# Patient Record
Sex: MALE | Race: WHITE | NOT HISPANIC OR LATINO | Employment: FULL TIME | URBAN - METROPOLITAN AREA
[De-identification: names, ages, dates, MRNs, and addresses within clinical notes are randomized per-mention and may not be internally consistent; named-entity substitution may affect disease eponyms.]

---

## 2020-06-08 ENCOUNTER — HOSPITAL ENCOUNTER (EMERGENCY)
Facility: HOSPITAL | Age: 20
Discharge: HOME | End: 2020-06-08
Attending: EMERGENCY MEDICINE

## 2020-06-08 VITALS
RESPIRATION RATE: 15 BRPM | DIASTOLIC BLOOD PRESSURE: 71 MMHG | HEIGHT: 73 IN | OXYGEN SATURATION: 99 % | HEART RATE: 72 BPM | TEMPERATURE: 98.8 F | BODY MASS INDEX: 32.43 KG/M2 | SYSTOLIC BLOOD PRESSURE: 155 MMHG | WEIGHT: 244.7 LBS

## 2020-06-08 DIAGNOSIS — T78.40XA ALLERGIC REACTION, INITIAL ENCOUNTER: Primary | ICD-10-CM

## 2020-06-08 DIAGNOSIS — Z91.030 BEE STING ALLERGY: ICD-10-CM

## 2020-06-08 PROCEDURE — 3E0333Z INTRODUCTION OF ANTI-INFLAMMATORY INTO PERIPHERAL VEIN, PERCUTANEOUS APPROACH: ICD-10-PCS | Performed by: EMERGENCY MEDICINE

## 2020-06-08 PROCEDURE — 99284 EMERGENCY DEPT VISIT MOD MDM: CPT | Mod: 25

## 2020-06-08 PROCEDURE — 63600000 HC DRUGS/DETAIL CODE: Performed by: EMERGENCY MEDICINE

## 2020-06-08 PROCEDURE — 96374 THER/PROPH/DIAG INJ IV PUSH: CPT

## 2020-06-08 RX ADMIN — METHYLPREDNISOLONE SODIUM SUCCINATE 60 MG: 125 INJECTION, POWDER, FOR SOLUTION INTRAMUSCULAR; INTRAVENOUS at 17:20

## 2020-06-08 ASSESSMENT — ENCOUNTER SYMPTOMS
COLOR CHANGE: 0
ALLERGIC REACTION: 1
DIFFICULTY BREATHING: 1
CHILLS: 0
SHORTNESS OF BREATH: 1
FEVER: 0
SEIZURES: 0
DIARRHEA: 0
ABDOMINAL PAIN: 0
EYE PAIN: 0
DYSURIA: 0
ARTHRALGIAS: 0
BACK PAIN: 0
COUGH: 0
SORE THROAT: 0
PALPITATIONS: 0
HEMATURIA: 0
VOMITING: 0

## 2020-06-08 NOTE — ED PROVIDER NOTES
HPI     Chief Complaint   Patient presents with   • Allergic Reaction         History provided by:  Patient  Allergic Reaction   Presenting symptoms: difficulty breathing, itching and swelling    Presenting symptoms: no rash    Severity:  Moderate  Duration:  15 minutes  Prior allergic episodes:  Insect allergies  Context: insect bite/sting (R foot, unseen insect, felt like a bee sting)    Relieved by:  Antihistamines and epinephrine (Epipen given by EMS)  Worsened by:  Nothing  Ineffective treatments:  None tried       Patient History     History reviewed. No pertinent past medical history.    Past Surgical History:   Procedure Laterality Date   • WISDOM TOOTH EXTRACTION         History reviewed. No pertinent family history.    Social History     Tobacco Use   • Smoking status: Never Smoker   • Smokeless tobacco: Never Used   Substance Use Topics   • Alcohol use: Yes     Comment: social    • Drug use: Never       Systems Reviewed from Nursing Triage:          Review of Systems     Review of Systems   Constitutional: Negative for chills and fever.   HENT: Negative for ear pain and sore throat.    Eyes: Negative for pain and visual disturbance.   Respiratory: Positive for shortness of breath. Negative for cough.    Cardiovascular: Negative for chest pain and palpitations.   Gastrointestinal: Negative for abdominal pain, diarrhea and vomiting.   Genitourinary: Negative for dysuria and hematuria.   Musculoskeletal: Negative for arthralgias and back pain.   Skin: Positive for itching. Negative for color change and rash.   Neurological: Negative for seizures and syncope.   All other systems reviewed and are negative.       Physical Exam     ED Triage Vitals [06/08/20 1711]   Temp Heart Rate Resp BP SpO2   36.6 °C (97.9 °F) 66 (!) 10 (!) 159/66 100 %      Temp Source Heart Rate Source Patient Position BP Location FiO2 (%) (Set)   Oral Monitor Sitting Right upper arm --       Pulse Ox %: 100 % (06/08/20 1741)  Pulse Ox  "Interpretation: Normal (06/08/20 1741)  Heart Rate: 68 (06/08/20 1741)  Rhythm Strip Interpretation: Normal Sinus Rhythm (06/08/20 1741)    Patient Vitals for the past 24 hrs:   BP Temp Temp src Pulse Resp SpO2 Height Weight   06/08/20 1801 (!) 155/73 -- -- 80 15 100 % -- --   06/08/20 1711 (!) 159/66 36.6 °C (97.9 °F) Oral 66 (!) 10 100 % 1.854 m (6' 1\") 111 kg (244 lb 11.2 oz)                                          Physical Exam   Constitutional: He is oriented to person, place, and time. He appears well-developed and well-nourished.  Non-toxic appearance. No distress.   HENT:   Head: Normocephalic and atraumatic.   Eyes: Conjunctivae are normal.   Neck: Neck supple.   Cardiovascular: Normal rate, regular rhythm, normal heart sounds and intact distal pulses.   No murmur heard.  Pulmonary/Chest: Effort normal and breath sounds normal. No tachypnea. No respiratory distress. He has no decreased breath sounds. He has no wheezes. He has no rhonchi. He has no rales.   Abdominal: Soft. Bowel sounds are normal. There is no tenderness. There is no rebound and no guarding.   Musculoskeletal: Normal range of motion. He exhibits no edema or tenderness.   Neurological: He is alert and oriented to person, place, and time. No sensory deficit. He exhibits normal muscle tone.   Skin: Skin is warm and dry. No rash noted. He is not diaphoretic. No erythema. No pallor.   Psychiatric: He has a normal mood and affect.   Nursing note and vitals reviewed.           Procedures    Labs Reviewed - No data to display    No orders to display               ED Course & MDM     MDM         ED Course as of Jun 08 1851   Mon Jun 08, 2020   1742 Pt states last tetanus 1-2 years ago.    [HB]   1849 6:49 PM.  Re-evaluation.  Pt feels better and symptoms improved. Pt remains asymptomatic, will return if he has any worsening symptoms. Pt would like to go home.  Shared results with pt.  Pt stable for discharge.        [HB]      ED Course User " Index  [HB] Maryse Cash DO         Clinical Impressions as of Jun 08 1851   Allergic reaction, initial encounter   Bee sting allergy        Maryse Cash DO  06/08/20 1851

## 2021-12-04 ENCOUNTER — ATHLETIC TRAINING (OUTPATIENT)
Dept: SPORTS MEDICINE | Facility: OTHER | Age: 21
End: 2021-12-04

## 2021-12-04 DIAGNOSIS — S76.012A STRAIN OF LEFT HIP ADDUCTOR MUSCLE, INITIAL ENCOUNTER: Primary | ICD-10-CM

## 2021-12-06 ENCOUNTER — ATHLETIC TRAINING (OUTPATIENT)
Dept: SPORTS MEDICINE | Facility: OTHER | Age: 21
End: 2021-12-06

## 2021-12-06 DIAGNOSIS — S76.012A STRAIN OF LEFT HIP ADDUCTOR MUSCLE, INITIAL ENCOUNTER: Primary | ICD-10-CM

## 2021-12-09 ENCOUNTER — ATHLETIC TRAINING (OUTPATIENT)
Dept: SPORTS MEDICINE | Facility: OTHER | Age: 21
End: 2021-12-09

## 2021-12-09 DIAGNOSIS — S76.012A STRAIN OF LEFT HIP ADDUCTOR MUSCLE, INITIAL ENCOUNTER: Primary | ICD-10-CM

## 2021-12-10 ENCOUNTER — ATHLETIC TRAINING (OUTPATIENT)
Dept: SPORTS MEDICINE | Facility: OTHER | Age: 21
End: 2021-12-10

## 2021-12-10 DIAGNOSIS — S76.012A STRAIN OF LEFT HIP ADDUCTOR MUSCLE, INITIAL ENCOUNTER: Primary | ICD-10-CM

## 2022-02-25 ENCOUNTER — ATHLETIC TRAINING (OUTPATIENT)
Dept: SPORTS MEDICINE | Facility: OTHER | Age: 22
End: 2022-02-25

## 2022-02-25 DIAGNOSIS — S29.011A STRAIN OF RIGHT PECTORALIS MUSCLE, INITIAL ENCOUNTER: Primary | ICD-10-CM

## 2022-09-09 ENCOUNTER — ATHLETIC TRAINING (OUTPATIENT)
Dept: SPORTS MEDICINE | Facility: OTHER | Age: 22
End: 2022-09-09

## 2022-09-09 DIAGNOSIS — G54.0 THORACIC OUTLET SYNDROME OF LEFT THORACIC OUTLET: Primary | ICD-10-CM

## 2022-09-09 NOTE — PROGRESS NOTES
Athletic Training Progress Note    Name: Perlita Nguyen  Age: 20 y o  Assessment/Plan:     Visit Diagnosis: Thoracic outlet syndrome of left thoracic outlet [G54 0]    Treatment Plan:    [x]  Follow-up PRN  []  Follow-up prior to next practice/game for re-evaluation  []  Daily treatment/rehab  Progress note expected weekly  Subjective: Pt came to sports med on 9/8/22 complaining of pain in his chest and shooting pain down into his left hand  Athlete was sent to health and wellness were referred him to ER  Objective:   Pt seemed visibly worried on 9/8/22 and wanted to seek further help  Normal vitals when taken in sports med      Treatment Log:     Date: 9/9/22       Playing Status:                Exercise/Treatment        Heat 10 mins       Cervical release  10 mins       Door Stretch

## 2022-09-12 ENCOUNTER — ATHLETIC TRAINING (OUTPATIENT)
Dept: SPORTS MEDICINE | Facility: OTHER | Age: 22
End: 2022-09-12

## 2022-09-12 DIAGNOSIS — G54.0 THORACIC OUTLET SYNDROME OF LEFT THORACIC OUTLET: Primary | ICD-10-CM

## 2022-09-13 NOTE — PROGRESS NOTES
Athletic Training Progress Note    Name: Marshall Carvalho  Age: 20 y o  Assessment/Plan:     Visit Diagnosis: Thoracic outlet syndrome of left thoracic outlet [G54 0]    Treatment Plan: Continuing manual therapies    [x]  Follow-up PRN  []  Follow-up prior to next practice/game for re-evaluation  []  Daily treatment/rehab  Progress note expected weekly  Subjective: States that they are feeling similar as previous days and tried to complete stretching exercises at home  Objective:   Experiencing numbness and tingling down into hand and chest  Also sometimes experiences some numbness up into his neck and sometimes his face  [place] with cupping and various other types of soft tissue work with [employer]      Treatment Log:    Date: 9/12/22 9/9/22       Playing Status:                  Exercise/Treatment         Heat 10 mins 10 mins       Cervical release  10 mins 10 mins       Door Stretch x

## 2022-11-14 ENCOUNTER — ATHLETIC TRAINING (OUTPATIENT)
Dept: SPORTS MEDICINE | Facility: OTHER | Age: 22
End: 2022-11-14

## 2022-11-14 DIAGNOSIS — R10.9 RIGHT LATERAL ABDOMINAL PAIN: Primary | ICD-10-CM

## 2022-11-14 DIAGNOSIS — S30.1XXA CONTUSION OF ABDOMINAL WALL, INITIAL ENCOUNTER: ICD-10-CM

## 2022-11-15 ENCOUNTER — ATHLETIC TRAINING (OUTPATIENT)
Dept: SPORTS MEDICINE | Facility: OTHER | Age: 22
End: 2022-11-15

## 2022-11-15 DIAGNOSIS — R10.9 RIGHT LATERAL ABDOMINAL PAIN: Primary | ICD-10-CM

## 2022-11-15 DIAGNOSIS — S30.1XXA CONTUSION OF ABDOMINAL WALL, INITIAL ENCOUNTER: ICD-10-CM

## 2022-11-15 NOTE — PROGRESS NOTES
11/14/22  A: Right Abdominal contusion  S: Pt was kicked in the side during a wrestling match  O: He was sent to the hospital for possible liver laceration  No internal damage was noted  P: Completed diaphragm release and start of rotational exercises    LB ATC

## 2022-11-16 ENCOUNTER — ATHLETIC TRAINING (OUTPATIENT)
Dept: SPORTS MEDICINE | Facility: OTHER | Age: 22
End: 2022-11-16

## 2022-11-16 DIAGNOSIS — R10.9 RIGHT LATERAL ABDOMINAL PAIN: Primary | ICD-10-CM

## 2022-11-16 NOTE — PROGRESS NOTES
11/16:  Pt completed his stretching and ROM exercises which are improving daily  He completed "bottom" wrestling drills  He will complete bottom and top drilling during practice today  CM    11/15  Pt states that his motion is increasing each day but his side is still very tender to the touch  LB ATC     11/14/22  A: Right Abdominal contusion  S: Pt was kicked in the side during a wrestling match  O: He was sent to the hospital for possible liver laceration  No internal damage was noted  P: Completed diaphragm release and start of rotational exercises    LB ATC

## 2022-11-16 NOTE — PROGRESS NOTES
11/15  Pt states that his motion is increasing each day but his side is still very tender to the touch  LB ATC    11/14/22  A: Right Abdominal contusion  S: Pt was kicked in the side during a wrestling match  O: He was sent to the hospital for possible liver laceration  No internal damage was noted  P: Completed diaphragm release and start of rotational exercises    LB ATC

## 2023-01-09 ENCOUNTER — ATHLETIC TRAINING (OUTPATIENT)
Dept: SPORTS MEDICINE | Facility: OTHER | Age: 23
End: 2023-01-09

## 2023-01-09 DIAGNOSIS — S83.242A ACUTE MEDIAL MENISCUS TEAR OF LEFT KNEE, INITIAL ENCOUNTER: ICD-10-CM

## 2023-01-09 DIAGNOSIS — M25.462 PAIN AND SWELLING OF KNEE, LEFT: Primary | ICD-10-CM

## 2023-01-09 DIAGNOSIS — M25.562 PAIN AND SWELLING OF KNEE, LEFT: Primary | ICD-10-CM

## 2023-01-09 DIAGNOSIS — S89.92XA ACUTE INJURY OF LEFT ANTERIOR CRUCIATE LIGAMENT, INITIAL ENCOUNTER: ICD-10-CM

## 2023-01-09 NOTE — PROGRESS NOTES
Athletic Training Knee Evaluation    Name: Leopoldo Lint  Age: 20 y o  Date of Assessment: 1/9/2023    Assessment/Plan:     Visit Diagnosis: Acute pain of right knee [M25 561]    Treatment Plan: Attempt to regain function for competition    []  Follow-up PRN  []  Follow-up prior to next practice/game for re-evaluation  [x]  Daily treatment/rehab  Progress note expected weekly       Referral:     []  Not needed at this time  []  Referred to:     []  Coaching staff notified  []  Parent/Guardian Notified    Subjective:    Date of Injury: 1/7/23    Injury occurred during:     []  Practice  [x]  Competition  []  Other:     Mechanism: Hopping on one leg with a person holding on to his Right leg    Previous History: MCL and LCL sprain    Reported Symptoms:     [] Felt pop [] Grinding   [x] Orren Eran a pop [] Pressure   [] Pain with rest [] Burning   [] Pain with activity [] Weakness   [] Pain with stairs [] Loss of motion   [x] Sharp pain [] Clicking   [] Dull pain [x] Snapping sensation   [] Radiating pain [] Locking   [] Felt give way       Objective:    Observation:     []  No observable findings compared bilaterally    [x] Swelling [] Genu recurvatum   [x] Effusion [] Genu valgum   [] Deformity [] Genu varus   [] Ecchymosis [] Patella leonel   [] Abnormal gait [] Patella baja   [] Atrophy [] Squinting patellae   [] Muscle spasm [] “Frog eye” patellae     Palpation: TTP anterior and posterior knee    Active Range of Motion:      Full  ROM Limited  ROM Pain  with  ROM No  Motion   Knee Flexion [] [x] [] []   Knee Extension [x] [] [] []   Hip Flexion [] [] [] []   Hip Extension [] [] [] []   Hip Abduction [] [] [] []   Hip Adduction [] [] [] []   Dorsiflexion [] [] [] []   Plantarflexion [] [] [] []     Manual Muscle Tests:     Not performed []             5 4+ 4 4- 3 or  Under   Knee Flexion [] [] [] [] []   Knee Extension [] [] [] [] []   Hip Flexion [] [] [] [] []   Hip Extension [] [] [] [] []   Hip Abduction [] [] [] [] []   Hip Adduction [] [] [] [] []   Dorsiflexion [] [] [] [] []   Plantarflexion [] [] [] [] []     Special Tests:      (+)  Laxity (+)  Pain (-)  WNL Not  Tested   Lachman [x] [x] [] []   Anterior Drawer [x] [x] [] []   Pivot Shift [] [] [] []   Posterior Drawer [] [] [] []   Sag [] [] [] []   Valgus (0 Degrees) [] [] [] []   Valgus (30 Degrees) [] [] [] []   Varus (0 Degrees) [] [] [] []   Varus (30 Degrees) [] [] [] []   Seferino [] [] [] []   Babatunde's [] [] [] []   Apley's [] [] [] []   Opal's [] [] [] []   Patellar Apprehension [] [] [] []   Patellar Glide [] [] [] []   Ballotable Patella  [] [] [] []     Treatment Log:     Date: 1/9/23   Playing Status: Out       Exercise/Treatment    Hopping 2x2   Knee extension 2x6                                       LB ATC

## 2023-01-10 ENCOUNTER — ATHLETIC TRAINING (OUTPATIENT)
Dept: SPORTS MEDICINE | Facility: OTHER | Age: 23
End: 2023-01-10

## 2023-01-10 DIAGNOSIS — M25.462 PAIN AND SWELLING OF KNEE, LEFT: Primary | ICD-10-CM

## 2023-01-10 DIAGNOSIS — M25.562 PAIN AND SWELLING OF KNEE, LEFT: Primary | ICD-10-CM

## 2023-01-10 DIAGNOSIS — S83.242A ACUTE MEDIAL MENISCUS TEAR OF LEFT KNEE, INITIAL ENCOUNTER: ICD-10-CM

## 2023-01-10 DIAGNOSIS — S89.92XA ACUTE INJURY OF LEFT ANTERIOR CRUCIATE LIGAMENT, INITIAL ENCOUNTER: ICD-10-CM

## 2023-01-10 NOTE — PROGRESS NOTES
Athletic Training Knee Evaluation    Name: Zachary Gaffney  Age: 20 y o  Date of Assessment: 1/10/2023    Assessment/Plan:     Visit Diagnosis: No primary diagnosis found  Treatment Plan: Attempt to regain function for competition    []  Follow-up PRN  []  Follow-up prior to next practice/game for re-evaluation  [x]  Daily treatment/rehab  Progress note expected weekly       Referral:     []  Not needed at this time  []  Referred to:     []  Coaching staff notified  []  Parent/Guardian Notified    Subjective:    Date of Injury: 1/7/23    Injury occurred during:     []  Practice  [x]  Competition  []  Other:     Mechanism: Hopping on one leg with a person holding on to his Right leg    Previous History: MCL and LCL sprain    Reported Symptoms:     [] Felt pop [] Grinding   [x] Tova Buster a pop [] Pressure   [] Pain with rest [] Burning   [] Pain with activity [] Weakness   [] Pain with stairs [] Loss of motion   [x] Sharp pain [] Clicking   [] Dull pain [x] Snapping sensation   [] Radiating pain [] Locking   [] Felt give way       Objective:    Observation:     []  No observable findings compared bilaterally    [x] Swelling [] Genu recurvatum   [x] Effusion [] Genu valgum   [] Deformity [] Genu varus   [] Ecchymosis [] Patella leonel   [] Abnormal gait [] Patella baja   [] Atrophy [] Squinting patellae   [] Muscle spasm [] “Frog eye” patellae     Palpation: TTP anterior and posterior knee    Active Range of Motion:      Full  ROM Limited  ROM Pain  with  ROM No  Motion   Knee Flexion [] [x] [] []   Knee Extension [x] [] [] []   Hip Flexion [] [] [] []   Hip Extension [] [] [] []   Hip Abduction [] [] [] []   Hip Adduction [] [] [] []   Dorsiflexion [] [] [] []   Plantarflexion [] [] [] []     Manual Muscle Tests:     Not performed []             5 4+ 4 4- 3 or  Under   Knee Flexion [] [] [] [] []   Knee Extension [] [] [] [] []   Hip Flexion [] [] [] [] []   Hip Extension [] [] [] [] []   Hip Abduction [] [] [] [] []   Hip Adduction [] [] [] [] []   Dorsiflexion [] [] [] [] []   Plantarflexion [] [] [] [] []     Special Tests:      (+)  Laxity (+)  Pain (-)  WNL Not  Tested   Lachman [x] [x] [] []   Anterior Drawer [x] [x] [] []   Pivot Shift [] [] [] []   Posterior Drawer [] [] [] []   Sag [] [] [] []   Valgus (0 Degrees) [] [] [] []   Valgus (30 Degrees) [] [] [] []   Varus (0 Degrees) [] [] [] []   Varus (30 Degrees) [] [] [] []   Seferino [] [] [] []   Thessally's [] [] [] []   Apley's [] [] [] []   Opal's [] [] [] []   Patellar Apprehension [] [] [] []   Patellar Glide [] [] [] []   Ballotable Patella  [] [] [] []     Treatment Log:     Date: 1/9/23   Playing Status: Out       Exercise/Treatment    Hopping 2x2   Knee extension 2x6                                       LB ATC    Athletic Training Progress Note    Name: Zenia Vaughn  Age: 20 y o  Assessment/Plan:     Visit Diagnosis: No primary diagnosis found  Treatment Plan: Progress from stance and motion    []  Follow-up PRN  []  Follow-up prior to next practice/game for re-evaluation  [x]  Daily treatment/rehab  Progress note expected weekly  Subjective: Pt felt more unstable after working but then more stable with compression wrap      Objective:   See treatment log below    Treatment Log:     Date: 1/10/23   Playing Status: Limited       Exercise/Treatment    VAC extension 2x5   Hopping 3x2   Hip abduction 2x5                                   LB ATC

## 2023-01-11 ENCOUNTER — ATHLETIC TRAINING (OUTPATIENT)
Dept: SPORTS MEDICINE | Facility: OTHER | Age: 23
End: 2023-01-11

## 2023-01-11 DIAGNOSIS — S30.1XXA CONTUSION OF ABDOMINAL WALL, INITIAL ENCOUNTER: Primary | ICD-10-CM

## 2023-01-11 DIAGNOSIS — S89.92XA ACUTE INJURY OF LEFT ANTERIOR CRUCIATE LIGAMENT, INITIAL ENCOUNTER: ICD-10-CM

## 2023-01-11 DIAGNOSIS — M25.462 PAIN AND SWELLING OF KNEE, LEFT: Primary | ICD-10-CM

## 2023-01-11 DIAGNOSIS — S83.242A ACUTE MEDIAL MENISCUS TEAR OF LEFT KNEE, INITIAL ENCOUNTER: ICD-10-CM

## 2023-01-11 DIAGNOSIS — M25.562 PAIN AND SWELLING OF KNEE, LEFT: Primary | ICD-10-CM

## 2023-01-11 NOTE — PROGRESS NOTES
11/20  Full activity  LB ATC    11/16:  Pt completed his stretching and ROM exercises which are improving daily  He completed "bottom" wrestling drills  He will complete bottom and top drilling during practice today  CM    11/15  Pt states that his motion is increasing each day but his side is still very tender to the touch  LB ATC     11/14/22  A: Right Abdominal contusion  S: Pt was kicked in the side during a wrestling match  O: He was sent to the hospital for possible liver laceration  No internal damage was noted  P: Completed diaphragm release and start of rotational exercises    LB ATC

## 2023-01-11 NOTE — PROGRESS NOTES
Athletic Training Knee Evaluation    Name: Midge Barthel  Age: 20 y o  Date of Assessment: 1/11/2023    Assessment/Plan:     Visit Diagnosis: Pain and swelling of knee, left [M25 562, M25 462]    Treatment Plan: Attempt to regain function for competition    []  Follow-up PRN  []  Follow-up prior to next practice/game for re-evaluation  [x]  Daily treatment/rehab  Progress note expected weekly       Referral:     []  Not needed at this time  []  Referred to:     []  Coaching staff notified  []  Parent/Guardian Notified    Subjective:    Date of Injury: 1/7/23    Injury occurred during:     []  Practice  [x]  Competition  []  Other:     Mechanism: Hopping on one leg with a person holding on to his Right leg    Previous History: MCL and LCL sprain    Reported Symptoms:     [] Felt pop [] Grinding   [x] [place] a pop [] Pressure   [] Pain with rest [] Burning   [] Pain with activity [] Weakness   [] Pain with stairs [] Loss of motion   [x] Sharp pain [] Clicking   [] Dull pain [x] Snapping sensation   [] Radiating pain [] Locking   [] Felt give way       Objective:    Observation:     []  No observable findings compared bilaterally    [x] Swelling [] Genu recurvatum   [x] Effusion [] Genu valgum   [] Deformity [] Genu varus   [] Ecchymosis [] Patella leonel   [] Abnormal gait [] Patella baja   [] Atrophy [] Squinting patellae   [] Muscle spasm [] “Frog eye” patellae     Palpation: TTP anterior and posterior knee    Active Range of Motion:      Full  ROM Limited  ROM Pain  with  ROM No  Motion   Knee Flexion [] [x] [] []   Knee Extension [x] [] [] []   Hip Flexion [] [] [] []   Hip Extension [] [] [] []   Hip Abduction [] [] [] []   Hip Adduction [] [] [] []   Dorsiflexion [] [] [] []   Plantarflexion [] [] [] []     Manual Muscle Tests:     Not performed []             5 4+ 4 4- 3 or  Under   Knee Flexion [] [] [] [] []   Knee Extension [] [] [] [] []   Hip Flexion [] [] [] [] []   Hip Extension [] [] [] [] [] Hip Abduction [] [] [] [] []   Hip Adduction [] [] [] [] []   Dorsiflexion [] [] [] [] []   Plantarflexion [] [] [] [] []     Special Tests:      (+)  Laxity (+)  Pain (-)  WNL Not  Tested   Lachman [x] [x] [] []   Anterior Drawer [x] [x] [] []   Pivot Shift [] [] [] []   Posterior Drawer [] [] [] []   Sag [] [] [] []   Valgus (0 Degrees) [] [] [] []   Valgus (30 Degrees) [] [] [] []   Varus (0 Degrees) [] [] [] []   Varus (30 Degrees) [] [] [] []   Seferino [] [] [] []   Thessally's [] [] [] []   Apley's [] [] [] []   Opal's [] [] [] []   Patellar Apprehension [] [] [] []   Patellar Glide [] [] [] []   Ballotable Patella  [] [] [] []     Treatment Log:     Date: 1/9/23   Playing Status: Out       Exercise/Treatment    Hopping 2x2   Knee extension 2x6                                       LB ATC    Athletic Training Progress Note    Name: Rosemary Zamudio  Age: 20 y o  Assessment/Plan:     Visit Diagnosis: Pain and swelling of knee, left [M25 562, M25 462]    Treatment Plan: Progress from stance and motion    []  Follow-up PRN  []  Follow-up prior to next practice/game for re-evaluation  [x]  Daily treatment/rehab  Progress note expected weekly  Subjective: Pt felt more unstable after working but then more stable with compression wrap  Objective:   See treatment log below    Treatment Log:     Date: 1/11/23 1/10/23   Playing Status: Limited Limited        Exercise/Treatment     VAC extension 2x5 2x5   Hopping 3x2 3x2   Hip abduction 3x7 2x5                                           LB ATC    1/11/23  Pt states that his Left knee felt good during stance and motion yesterday    KOOS: 51  LB ATC

## 2023-01-12 ENCOUNTER — ATHLETIC TRAINING (OUTPATIENT)
Dept: SPORTS MEDICINE | Facility: OTHER | Age: 23
End: 2023-01-12

## 2023-01-12 DIAGNOSIS — M25.562 PAIN AND SWELLING OF KNEE, LEFT: Primary | ICD-10-CM

## 2023-01-12 DIAGNOSIS — S83.242A ACUTE MEDIAL MENISCUS TEAR OF LEFT KNEE, INITIAL ENCOUNTER: ICD-10-CM

## 2023-01-12 DIAGNOSIS — S89.92XA ACUTE INJURY OF LEFT ANTERIOR CRUCIATE LIGAMENT, INITIAL ENCOUNTER: ICD-10-CM

## 2023-01-12 DIAGNOSIS — M25.462 PAIN AND SWELLING OF KNEE, LEFT: Primary | ICD-10-CM

## 2023-01-12 NOTE — PROGRESS NOTES
Athletic Training Knee Evaluation    Name: Rosemary Zamudio  Age: 20 y o  Date of Assessment: 1/11/2023    Assessment/Plan:     Visit Diagnosis: Pain and swelling of knee, left [M25 562, M25 462]    Treatment Plan: Attempt to regain function for competition    []  Follow-up PRN  []  Follow-up prior to next practice/game for re-evaluation  [x]  Daily treatment/rehab  Progress note expected weekly       Referral:     []  Not needed at this time  []  Referred to:     []  Coaching staff notified  []  Parent/Guardian Notified    Subjective:    Date of Injury: 1/7/23    Injury occurred during:     []  Practice  [x]  Competition  []  Other:     Mechanism: Hopping on one leg with a person holding on to his Right leg    Previous History: MCL and LCL sprain    Reported Symptoms:     [] Felt pop [] Grinding   [x] Chrystine Chris a pop [] Pressure   [] Pain with rest [] Burning   [] Pain with activity [] Weakness   [] Pain with stairs [] Loss of motion   [x] Sharp pain [] Clicking   [] Dull pain [x] Snapping sensation   [] Radiating pain [] Locking   [] Felt give way       Objective:    Observation:     []  No observable findings compared bilaterally    [x] Swelling [] Genu recurvatum   [x] Effusion [] Genu valgum   [] Deformity [] Genu varus   [] Ecchymosis [] Patella leonel   [] Abnormal gait [] Patella baja   [] Atrophy [] Squinting patellae   [] Muscle spasm [] “Frog eye” patellae     Palpation: TTP anterior and posterior knee    Active Range of Motion:      Full  ROM Limited  ROM Pain  with  ROM No  Motion   Knee Flexion [] [x] [] []   Knee Extension [x] [] [] []   Hip Flexion [] [] [] []   Hip Extension [] [] [] []   Hip Abduction [] [] [] []   Hip Adduction [] [] [] []   Dorsiflexion [] [] [] []   Plantarflexion [] [] [] []     Manual Muscle Tests:     Not performed []             5 4+ 4 4- 3 or  Under   Knee Flexion [] [] [] [] []   Knee Extension [] [] [] [] []   Hip Flexion [] [] [] [] []   Hip Extension [] [] [] [] [] Hip Abduction [] [] [] [] []   Hip Adduction [] [] [] [] []   Dorsiflexion [] [] [] [] []   Plantarflexion [] [] [] [] []     Special Tests:      (+)  Laxity (+)  Pain (-)  WNL Not  Tested   Lachman [x] [x] [] []   Anterior Drawer [x] [x] [] []   Pivot Shift [] [] [] []   Posterior Drawer [] [] [] []   Sag [] [] [] []   Valgus (0 Degrees) [] [] [] []   Valgus (30 Degrees) [] [] [] []   Varus (0 Degrees) [] [] [] []   Varus (30 Degrees) [] [] [] []   Seferino [] [] [] []   Thessally's [] [] [] []   Apley's [] [] [] []   Opal's [] [] [] []   Patellar Apprehension [] [] [] []   Patellar Glide [] [] [] []   Ballotable Patella  [] [] [] []     Treatment Log:     Date: 1/9/23   Playing Status: Out       Exercise/Treatment    Hopping 2x2   Knee extension 2x6                                       LB ATC    Athletic Training Progress Note    Name: Navi Philippe  Age: 20 y o  Assessment/Plan:     Visit Diagnosis: Pain and swelling of knee, left [M25 562, M25 462]    Treatment Plan: Progress from stance and motion    []  Follow-up PRN  []  Follow-up prior to next practice/game for re-evaluation  [x]  Daily treatment/rehab  Progress note expected weekly  Subjective: Pt felt more unstable after working but then more stable with compression wrap  Objective:   See treatment log below    Treatment Log:     Date: 1/12 1/11/23 1/10/23   Playing Status: Limited Limited Limited         Exercise/Treatment      VAC extension 2x5 2x5 2x5   Hopping 3x2 3x2 3x2   Hip abduction  3x7 2x5   Squats 3x4                                               LB ATC    1/12/23  Pt is prepared to weight in but not wrestle  He was in discomfort during the flexion portion of VAC  LB ATC    1/11/23  Pt states that his Left knee felt good during stance and motion yesterday    KOOS: 51  LB ATC

## 2023-01-15 ENCOUNTER — ATHLETIC TRAINING (OUTPATIENT)
Dept: SPORTS MEDICINE | Facility: OTHER | Age: 23
End: 2023-01-15

## 2023-01-15 DIAGNOSIS — M25.562 PAIN AND SWELLING OF KNEE, LEFT: Primary | ICD-10-CM

## 2023-01-15 DIAGNOSIS — S83.242A ACUTE MEDIAL MENISCUS TEAR OF LEFT KNEE, INITIAL ENCOUNTER: ICD-10-CM

## 2023-01-15 DIAGNOSIS — M25.462 PAIN AND SWELLING OF KNEE, LEFT: Primary | ICD-10-CM

## 2023-01-15 DIAGNOSIS — S89.92XA ACUTE INJURY OF LEFT ANTERIOR CRUCIATE LIGAMENT, INITIAL ENCOUNTER: ICD-10-CM

## 2023-01-16 ENCOUNTER — ATHLETIC TRAINING (OUTPATIENT)
Dept: SPORTS MEDICINE | Facility: OTHER | Age: 23
End: 2023-01-16

## 2023-01-16 VITALS
HEIGHT: 74 IN | DIASTOLIC BLOOD PRESSURE: 86 MMHG | TEMPERATURE: 98.7 F | WEIGHT: 245 LBS | HEART RATE: 72 BPM | BODY MASS INDEX: 31.44 KG/M2 | SYSTOLIC BLOOD PRESSURE: 150 MMHG

## 2023-01-16 DIAGNOSIS — M25.462 PAIN AND SWELLING OF KNEE, LEFT: Primary | ICD-10-CM

## 2023-01-16 DIAGNOSIS — S89.92XA INJURY OF LEFT KNEE, INITIAL ENCOUNTER: Primary | ICD-10-CM

## 2023-01-16 DIAGNOSIS — S83.512A RUPTURE OF ANTERIOR CRUCIATE LIGAMENT OF LEFT KNEE, INITIAL ENCOUNTER: ICD-10-CM

## 2023-01-16 DIAGNOSIS — S83.242A ACUTE MEDIAL MENISCUS TEAR OF LEFT KNEE, INITIAL ENCOUNTER: ICD-10-CM

## 2023-01-16 DIAGNOSIS — M25.562 PAIN AND SWELLING OF KNEE, LEFT: Primary | ICD-10-CM

## 2023-01-16 DIAGNOSIS — S89.92XA ACUTE INJURY OF LEFT ANTERIOR CRUCIATE LIGAMENT, INITIAL ENCOUNTER: ICD-10-CM

## 2023-01-16 NOTE — PROGRESS NOTES
Athletic Training Knee Evaluation    Name: Karel Portillo  Age: 20 y o  Date of Assessment: 1/11/2023    Assessment/Plan:     Visit Diagnosis: Pain and swelling of knee, left [M25 562, M25 462]    Treatment Plan: Attempt to regain function for competition    []  Follow-up PRN  []  Follow-up prior to next practice/game for re-evaluation  [x]  Daily treatment/rehab  Progress note expected weekly       Referral:     []  Not needed at this time  []  Referred to:     []  Coaching staff notified  []  Parent/Guardian Notified    Subjective:    Date of Injury: 1/7/23    Injury occurred during:     []  Practice  [x]  Competition  []  Other:     Mechanism: Hopping on one leg with a person holding on to his Right leg    Previous History: MCL and LCL sprain    Reported Symptoms:     [] Felt pop [] Grinding   [x] [place] a pop [] Pressure   [] Pain with rest [] Burning   [] Pain with activity [] Weakness   [] Pain with stairs [] Loss of motion   [x] Sharp pain [] Clicking   [] Dull pain [x] Snapping sensation   [] Radiating pain [] Locking   [] Felt give way       Objective:    Observation:     []  No observable findings compared bilaterally    [x] Swelling [] Genu recurvatum   [x] Effusion [] Genu valgum   [] Deformity [] Genu varus   [] Ecchymosis [] Patella leonel   [] Abnormal gait [] Patella baja   [] Atrophy [] Squinting patellae   [] Muscle spasm [] “Frog eye” patellae     Palpation: TTP anterior and posterior knee    Active Range of Motion:      Full  ROM Limited  ROM Pain  with  ROM No  Motion   Knee Flexion [] [x] [] []   Knee Extension [x] [] [] []   Hip Flexion [] [] [] []   Hip Extension [] [] [] []   Hip Abduction [] [] [] []   Hip Adduction [] [] [] []   Dorsiflexion [] [] [] []   Plantarflexion [] [] [] []     Manual Muscle Tests:     Not performed []             5 4+ 4 4- 3 or  Under   Knee Flexion [] [] [] [] []   Knee Extension [] [] [] [] []   Hip Flexion [] [] [] [] []   Hip Extension [] [] [] [] [] Hip Abduction [] [] [] [] []   Hip Adduction [] [] [] [] []   Dorsiflexion [] [] [] [] []   Plantarflexion [] [] [] [] []     Special Tests:      (+)  Laxity (+)  Pain (-)  WNL Not  Tested   Lachman [x] [x] [] []   Anterior Drawer [x] [x] [] []   Pivot Shift [] [] [] []   Posterior Drawer [] [] [] []   Sag [] [] [] []   Valgus (0 Degrees) [] [] [] []   Valgus (30 Degrees) [] [] [] []   Varus (0 Degrees) [] [] [] []   Varus (30 Degrees) [] [] [] []   Seferino [] [] [] []   Thessally's [] [] [] []   Apley's [] [] [] []   Opal's [] [] [] []   Patellar Apprehension [] [] [] []   Patellar Glide [] [] [] []   Ballotable Patella  [] [] [] []     Treatment Log:     Date: 1/9/23   Playing Status: Out       Exercise/Treatment    Hopping 2x2   Knee extension 2x6                                       LB ATC    Athletic Training Progress Note    Name: Eliseo Saldivar  Age: 20 y o  Assessment/Plan:     Visit Diagnosis: Pain and swelling of knee, left [M25 562, M25 462]    Treatment Plan: Progress from stance and motion    []  Follow-up PRN  []  Follow-up prior to next practice/game for re-evaluation  [x]  Daily treatment/rehab  Progress note expected weekly  Subjective: Pt felt more unstable after working but then more stable with compression wrap  Objective:   See treatment log below    Treatment Log:     Date: 1/15 1/12 1/11/23 1/10/23   Playing Status: Limited Limited Limited Limited          Exercise/Treatment       VAC extension 2x5 hopping 2x5 2x5 2x5   Hopping  3x2 3x2 3x2   Hip abduction 3x7  3x7 2x5   Squats  3x4     Functional Power moves 2x2      Knee Flexion 3x4                                         LB ATC    1/15/23  Pt wanted to wrestle this weekend  Will attempt power wrestling movements 1/16 and prepare to wrestle on 1/17  LB ATC    1/12/23  Pt is prepared to weight in but not wrestle  He was in discomfort during the flexion portion of VAC    LB ATC    1/11/23  Pt states that his Left knee felt good during stance and motion yesterday    KOOS: 51  LB ATC

## 2023-01-16 NOTE — PROGRESS NOTES
Athletic Training Knee Evaluation    Name: Padilla Hampton  Age: 20 y o  Date of Assessment: 1/11/2023    Assessment/Plan:     Visit Diagnosis: Pain and swelling of knee, left [M25 562, M25 462]    Treatment Plan: Attempt to regain function for competition    []  Follow-up PRN  []  Follow-up prior to next practice/game for re-evaluation  [x]  Daily treatment/rehab  Progress note expected weekly       Referral:     []  Not needed at this time  []  Referred to:     []  Coaching staff notified  []  Parent/Guardian Notified    Subjective:    Date of Injury: 1/7/23    Injury occurred during:     []  Practice  [x]  Competition  []  Other:     Mechanism: Hopping on one leg with a person holding on to his Right leg    Previous History: MCL and LCL sprain    Reported Symptoms:     [] Felt pop [] Grinding   [x] [place] a pop [] Pressure   [] Pain with rest [] Burning   [] Pain with activity [] Weakness   [] Pain with stairs [] Loss of motion   [x] Sharp pain [] Clicking   [] Dull pain [x] Snapping sensation   [] Radiating pain [] Locking   [] Felt give way       Objective:    Observation:     []  No observable findings compared bilaterally    [x] Swelling [] Genu recurvatum   [x] Effusion [] Genu valgum   [] Deformity [] Genu varus   [] Ecchymosis [] Patella leonel   [] Abnormal gait [] Patella baja   [] Atrophy [] Squinting patellae   [] Muscle spasm [] “Frog eye” patellae     Palpation: TTP anterior and posterior knee    Active Range of Motion:      Full  ROM Limited  ROM Pain  with  ROM No  Motion   Knee Flexion [] [x] [] []   Knee Extension [x] [] [] []   Hip Flexion [] [] [] []   Hip Extension [] [] [] []   Hip Abduction [] [] [] []   Hip Adduction [] [] [] []   Dorsiflexion [] [] [] []   Plantarflexion [] [] [] []     Manual Muscle Tests:     Not performed []             5 4+ 4 4- 3 or  Under   Knee Flexion [] [] [] [] []   Knee Extension [] [] [] [] []   Hip Flexion [] [] [] [] []   Hip Extension [] [] [] [] [] Hip Abduction [] [] [] [] []   Hip Adduction [] [] [] [] []   Dorsiflexion [] [] [] [] []   Plantarflexion [] [] [] [] []     Special Tests:      (+)  Laxity (+)  Pain (-)  WNL Not  Tested   Lachman [x] [x] [] []   Anterior Drawer [x] [x] [] []   Pivot Shift [] [] [] []   Posterior Drawer [] [] [] []   Sag [] [] [] []   Valgus (0 Degrees) [] [] [] []   Valgus (30 Degrees) [] [] [] []   Varus (0 Degrees) [] [] [] []   Varus (30 Degrees) [] [] [] []   Seferino [] [] [] []   Thessally's [] [] [] []   Apley's [] [] [] []   Opal's [] [] [] []   Patellar Apprehension [] [] [] []   Patellar Glide [] [] [] []   Ballotable Patella  [] [] [] []     Treatment Log:     Date: 1/9/23   Playing Status: Out       Exercise/Treatment    Hopping 2x2   Knee extension 2x6                                       LB ATC    Athletic Training Progress Note    Name: Valdez Mcfarlane  Age: 20 y o  Assessment/Plan:     Visit Diagnosis: Pain and swelling of knee, left [M25 562, M25 462]    Treatment Plan: Progress from stance and motion    []  Follow-up PRN  []  Follow-up prior to next practice/game for re-evaluation  [x]  Daily treatment/rehab  Progress note expected weekly  Subjective: Pt felt more unstable after working but then more stable with compression wrap  Objective:   See treatment log below    Treatment Log:     Date: 1/16 1/15 1/12 1/11/23 1/10/23   Playing Status: As tolerated Limited Limited Limited Limited           Exercise/Treatment        VAC extension 2x5 bounding 2x5 hopping 2x5 2x5 2x5   Hopping   3x2 3x2 3x2   Hip abduction  3x7  3x7 2x5   Squats   3x4     Functional Power moves  2x2      Knee Flexion  3x4                                              LB ATC    1/16/23  Pt completed shooting, sprawling, second shot, mat return, and escape explosion with limited problems a practice will attempt to wrestling tomorrow night  Dr Otoniel Vasquez believes he tore his ACL    LB ATC    1/15/23  Pt wanted to wrestle this weekend  Will attempt power wrestling movements 1/16 and prepare to wrestle on 1/17  LB ATC    1/12/23  Pt is prepared to weight in but not wrestle  He was in discomfort during the flexion portion of VAC  LB ATC    1/11/23  Pt states that his Left knee felt good during stance and motion yesterday    KOOS: 51  LB ATC

## 2023-01-16 NOTE — PROGRESS NOTES
1  Injury of left knee, initial encounter  XR knee 4+ vw left injury    MRI knee left  wo contrast      2  Rupture of anterior cruciate ligament of left knee, initial encounter  XR knee 4+ vw left injury    MRI knee left  wo contrast    Clinically diagnosed        Orders Placed This Encounter   Procedures   • XR knee 4+ vw left injury   • MRI knee left  wo contrast        Imaging Studies (I personally reviewed images in PACS and report):    • No recent imaging to review today    IMPRESSION:  • Acute left knee pain/injury after planting/twisting of left lower extremity while wrestling  • Clinical exam consistent with ACL tear with positive anterior drawer/Lachman's  • Clinically, symptoms are improving and that he has improved range of motion, decreased swelling  Only reports a sense of instability and mild discomfort of his knee  Date of Injury: 1/7/2023    Other factors:  Wrestler at [facility]:    • Clinical exam and radiographic imaging reviewed with patient today, with impression as per above  I have discussed with the patient the pathophysiology of this diagnosis and reviewed how the examination correlates with this diagnosis  • Radiographic x-rays as well as MRI of his left knee without contrast ordered today for further evaluation  • In regards to return to sport, I counseled that he can attempt to return as tolerated provided he wear a brace on his left lower extremity  I counseled for him to aggressively continue working with this  in order to improve the strength/function of the muscles that surround his knee  I counseled if he is unable to participate in wrestling due to a sense of instability or giving out, I would recommend he have obtain imaging and seek further care from orthopedic surgery as he will likely need to undergo surgery for his knee in the future    • I did offer imaging and sending him to orthopedic surgery for now, but patient states he prefers to try a conservative route to see if he can improve his symptoms/function and 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   I counseled there is a reasonable option going forward  • Case was discussed with the athletic trainers at his [place] and they will be fitting him for a brace going forward and continue to working on therapy with him during the season  No follow-ups on file  Portions of the record may have been created with voice recognition software  Occasional wrong word or "sound a like" substitutions may have occurred due to the inherent limitations of voice recognition software  Read the chart carefully and recognize, using context, where substitutions have occurred  CHIEF COMPLAINT:  Left knee injury    HPI:  Jenn Wilson is a 20 y o  male  who presents for       Visit 1/16/2023:  Initial evaluation of left knee injury:  Of note patient is a  and participates in wrestling  He had a precipitating injury on 1/7/2023 during a match  He states his left leg was planted and twisted when his opponent leaned into the patient causing him to apply extra pressure on his planted foot and he felt a buckling/popping sensation of his knee  Of note, patient states he has a history of an LCL sprain of his left knee in the past   He states the pain that he experienced from the injury was different than anything he is ever experienced before  He reports today that his symptoms been progressively improving and that the intensity of pain is mild, intermittent  He states when he does experience pain is over the anterior aspect of his knee and posterior aspect of his knee  He states it is aggravated during different motions while planting his foot  He states there is a sense of instability in his knee as if it would give out  He has been working with his athletic trainers regards to compressing, icing, heat all of which provide some relief  He states his range of motion has improved    He has not returned back to sports since the injury  He has no imaging of his knee nor has he seen a another provider since this injury  Denies any N/T of his left lower extremity  Does report crepitus of his knee during range of motion movements  Denies any discoloration of his knee  Medical, Surgical, Family, and Social History    No past medical history on file  No past surgical history on file  Social History   Social History     Substance and Sexual Activity   Alcohol Use Not on file     Social History     Substance and Sexual Activity   Drug Use Not on file     Social History     Tobacco Use   Smoking Status Not on file   Smokeless Tobacco Not on file     No family history on file  Not on File       Physical Exam  /86   Pulse 72   Temp 98 7 °F (37 1 °C)   Ht 6' 2" (1 88 m)   Wt 111 kg (245 lb)   BMI 31 46 kg/m²     Constitutional:  see vital signs  Gen: well-developed, normocephalic/atraumatic, well-groomed  Pulmonary/Chest: Effort normal  No respiratory distress         Ortho Exam  Left Knee Exam:  Erythema: no  Swelling: +prepatellar bursa swelling  Increased Warmth: no  Tenderness: +anterior aspect over patellar tendon  ROM: 0-130  Knee flexion strength: 5/5  Knee extension strength: 5/5  Patellar Apprehension: negative  Patellar Grind: negative  Lachman's: +laxity  Anterior Drawer: + laxity  Varus laxity: negative  Valgus laxity: negative  Heather: negative     Sensation intact to light touch      No calf tenderness to palpation     Gait: Normal without antalgia    Procedures

## 2023-01-17 ENCOUNTER — ATHLETIC TRAINING (OUTPATIENT)
Dept: SPORTS MEDICINE | Facility: OTHER | Age: 23
End: 2023-01-17

## 2023-01-17 DIAGNOSIS — M25.462 PAIN AND SWELLING OF KNEE, LEFT: Primary | ICD-10-CM

## 2023-01-17 DIAGNOSIS — M25.562 PAIN AND SWELLING OF KNEE, LEFT: Primary | ICD-10-CM

## 2023-01-17 DIAGNOSIS — S89.92XA ACUTE INJURY OF LEFT ANTERIOR CRUCIATE LIGAMENT, INITIAL ENCOUNTER: ICD-10-CM

## 2023-01-17 NOTE — PROGRESS NOTES
Athletic Training Knee Evaluation    Name: Mary Jane Villa  Age: 20 y o  Date of Assessment: 1/11/2023    Assessment/Plan:     Visit Diagnosis: Pain and swelling of knee, left [M25 562, M25 462]    Treatment Plan: Attempt to regain function for competition    []  Follow-up PRN  []  Follow-up prior to next practice/game for re-evaluation  [x]  Daily treatment/rehab  Progress note expected weekly       Referral:     []  Not needed at this time  []  Referred to:     []  Coaching staff notified  []  Parent/Guardian Notified    Subjective:    Date of Injury: 1/7/23    Injury occurred during:     []  Practice  [x]  Competition  []  Other:     Mechanism: Hopping on one leg with a person holding on to his Right leg    Previous History: MCL and LCL sprain    Reported Symptoms:     [] Felt pop [] Grinding   [x] [place] Ted a pop [] Pressure   [] Pain with rest [] Burning   [] Pain with activity [] Weakness   [] Pain with stairs [] Loss of motion   [x] Sharp pain [] Clicking   [] Dull pain [x] Snapping sensation   [] Radiating pain [] Locking   [] Felt give way       Objective:    Observation:     []  No observable findings compared bilaterally    [x] Swelling [] Genu recurvatum   [x] Effusion [] Genu valgum   [] Deformity [] Genu varus   [] Ecchymosis [] Patella leonel   [] Abnormal gait [] Patella baja   [] Atrophy [] Squinting patellae   [] Muscle spasm [] “Frog eye” patellae     Palpation: TTP anterior and posterior knee    Active Range of Motion:      Full  ROM Limited  ROM Pain  with  ROM No  Motion   Knee Flexion [] [x] [] []   Knee Extension [x] [] [] []   Hip Flexion [] [] [] []   Hip Extension [] [] [] []   Hip Abduction [] [] [] []   Hip Adduction [] [] [] []   Dorsiflexion [] [] [] []   Plantarflexion [] [] [] []     Manual Muscle Tests:     Not performed []             5 4+ 4 4- 3 or  Under   Knee Flexion [] [] [] [] []   Knee Extension [] [] [] [] []   Hip Flexion [] [] [] [] []   Hip Extension [] [] [] [] [] Hip Abduction [] [] [] [] []   Hip Adduction [] [] [] [] []   Dorsiflexion [] [] [] [] []   Plantarflexion [] [] [] [] []     Special Tests:      (+)  Laxity (+)  Pain (-)  WNL Not  Tested   Lachman [x] [x] [] []   Anterior Drawer [x] [x] [] []   Pivot Shift [] [] [] []   Posterior Drawer [] [] [] []   Sag [] [] [] []   Valgus (0 Degrees) [] [] [] []   Valgus (30 Degrees) [] [] [] []   Varus (0 Degrees) [] [] [] []   Varus (30 Degrees) [] [] [] []   Seferino [] [] [] []   Thessally's [] [] [] []   Apley's [] [] [] []   Opal's [] [] [] []   Patellar Apprehension [] [] [] []   Patellar Glide [] [] [] []   Ballotable Patella  [] [] [] []     Treatment Log:     Date: 1/9/23   Playing Status: Out       Exercise/Treatment    Hopping 2x2   Knee extension 2x6                                       LB ATC    Athletic Training Progress Note    Name: Nichelle Homes  Age: 20 y o  Assessment/Plan:     Visit Diagnosis: Pain and swelling of knee, left [M25 562, M25 462]    Treatment Plan: Progress from stance and motion    []  Follow-up PRN  []  Follow-up prior to next practice/game for re-evaluation  [x]  Daily treatment/rehab  Progress note expected weekly  Subjective: Pt felt more unstable after working but then more stable with compression wrap  Objective:   See treatment log below    Treatment Log:     Date: 1/16 1/15 1/12 1/11/23 1/10/23   Playing Status: As tolerated Limited Limited Limited Limited           Exercise/Treatment        VAC extension 2x5 bounding 2x5 hopping 2x5 2x5 2x5   Hopping   3x2 3x2 3x2   Hip abduction  3x7  3x7 2x5   Squats   3x4     Functional Power moves  2x2      Knee Flexion  3x4                                              LB ATC    1/17/23  KOOS: 72   LB ATC    1/16/23  Pt completed shooting, sprawling, second shot, mat return, and escape explosion with limited problems a practice will attempt to wrestling tomorrow night  Dr Clint Smith believes he tore his ACL    LB ATC    1/15/23  Pt wanted to wrestle this weekend  Will attempt power wrestling movements 1/16 and prepare to wrestle on 1/17  LB ATC    1/12/23  Pt is prepared to weight in but not wrestle  He was in discomfort during the flexion portion of VAC  LB ATC    1/11/23  Pt states that his Left knee felt good during stance and motion yesterday    KOOS: 51  LB ATC

## 2023-01-25 ENCOUNTER — ATHLETIC TRAINING (OUTPATIENT)
Dept: SPORTS MEDICINE | Facility: OTHER | Age: 23
End: 2023-01-25

## 2023-01-25 DIAGNOSIS — S63.630A SPRAIN OF INTERPHALANGEAL JOINT OF RIGHT INDEX FINGER, INITIAL ENCOUNTER: Primary | ICD-10-CM

## 2023-01-25 DIAGNOSIS — M79.644 FINGER PAIN, RIGHT: ICD-10-CM

## 2023-02-06 ENCOUNTER — ATHLETIC TRAINING (OUTPATIENT)
Dept: SPORTS MEDICINE | Facility: OTHER | Age: 23
End: 2023-02-06

## 2023-02-06 DIAGNOSIS — M25.562 PAIN AND SWELLING OF KNEE, LEFT: ICD-10-CM

## 2023-02-06 DIAGNOSIS — M25.462 PAIN AND SWELLING OF KNEE, LEFT: ICD-10-CM

## 2023-02-06 DIAGNOSIS — S89.92XA ACUTE INJURY OF LEFT ANTERIOR CRUCIATE LIGAMENT, INITIAL ENCOUNTER: Primary | ICD-10-CM

## 2023-02-07 NOTE — PROGRESS NOTES
Athletic Training Knee Evaluation    Name: Valdez Mcfarlane  Age: 20 y o  Date of Assessment: 1/11/2023    Assessment/Plan:     Visit Diagnosis: Pain and swelling of knee, left [M25 562, M25 462]    Treatment Plan: Attempt to regain function for competition    []  Follow-up PRN  []  Follow-up prior to next practice/game for re-evaluation  [x]  Daily treatment/rehab  Progress note expected weekly       Referral:     []  Not needed at this time  []  Referred to:     []  Coaching staff notified  []  Parent/Guardian Notified    Subjective:    Date of Injury: 1/7/23    Injury occurred during:     []  Practice  [x]  Competition  []  Other:     Mechanism: Hopping on one leg with a person holding on to his Right leg    Previous History: MCL and LCL sprain    Reported Symptoms:     [] Felt pop [] Grinding   [x] Heather Kamryn a pop [] Pressure   [] Pain with rest [] Burning   [] Pain with activity [] Weakness   [] Pain with stairs [] Loss of motion   [x] Sharp pain [] Clicking   [] Dull pain [x] Snapping sensation   [] Radiating pain [] Locking   [] Felt give way       Objective:    Observation:     []  No observable findings compared bilaterally    [x] Swelling [] Genu recurvatum   [x] Effusion [] Genu valgum   [] Deformity [] Genu varus   [] Ecchymosis [] Patella leonel   [] Abnormal gait [] Patella baja   [] Atrophy [] Squinting patellae   [] Muscle spasm [] “Frog eye” patellae     Palpation: TTP anterior and posterior knee    Active Range of Motion:      Full  ROM Limited  ROM Pain  with  ROM No  Motion   Knee Flexion [] [x] [] []   Knee Extension [x] [] [] []   Hip Flexion [] [] [] []   Hip Extension [] [] [] []   Hip Abduction [] [] [] []   Hip Adduction [] [] [] []   Dorsiflexion [] [] [] []   Plantarflexion [] [] [] []     Manual Muscle Tests:     Not performed []             5 4+ 4 4- 3 or  Under   Knee Flexion [] [] [] [] []   Knee Extension [] [] [] [] []   Hip Flexion [] [] [] [] []   Hip Extension [] [] [] [] [] Hip Abduction [] [] [] [] []   Hip Adduction [] [] [] [] []   Dorsiflexion [] [] [] [] []   Plantarflexion [] [] [] [] []     Special Tests:      (+)  Laxity (+)  Pain (-)  WNL Not  Tested   Lachman [x] [x] [] []   Anterior Drawer [x] [x] [] []   Pivot Shift [] [] [] []   Posterior Drawer [] [] [] []   Sag [] [] [] []   Valgus (0 Degrees) [] [] [] []   Valgus (30 Degrees) [] [] [] []   Varus (0 Degrees) [] [] [] []   Varus (30 Degrees) [] [] [] []   Seferino [] [] [] []   Thessally's [] [] [] []   Apley's [] [] [] []   Opal's [] [] [] []   Patellar Apprehension [] [] [] []   Patellar Glide [] [] [] []   Ballotable Patella  [] [] [] []     Treatment Log:     Date: 1/9/23   Playing Status: Out       Exercise/Treatment    Hopping 2x2   Knee extension 2x6                                       LB ATC    Athletic Training Progress Note    Name: Ryan Rossi  Age: 20 y o  Assessment/Plan:     Visit Diagnosis: Pain and swelling of knee, left [M25 562, M25 462]    Treatment Plan: Progress from stance and motion    []  Follow-up PRN  []  Follow-up prior to next practice/game for re-evaluation  [x]  Daily treatment/rehab  Progress note expected weekly  Subjective: Pt felt more unstable after working but then more stable with compression wrap  Objective:   See treatment log below    Treatment Log:     Date: 2/6 1/16 1/15 1/12 1/11/23 1/10/23   Playing Status: As tolerated As tolerated Limited Limited Limited Limited            Exercise/Treatment         VAC extension  2x5 bounding 2x5 hopping 2x5 2x5 2x5   Hopping    3x2 3x2 3x2   Hip abduction   3x7  3x7 2x5   Squats    3x4     Functional Power moves   2x2      Knee Flexion   3x4      Laying lift off x5        Kneeling lift off x5        Standing lift off x5                          LB ATC    2/3  Pt has competed in all but one match since returning  He feels sore and tight  Completed function exercises    RENE ATC    1/17/23  KOOS: 72   RENE ATC    1/16/23  Pt completed shooting, sprawling, second shot, mat return, and escape explosion with limited problems a practice will attempt to wrestling tomorrow night  Dr Juventino Gaitan believes he tore his ACL  LB ATC    1/15/23  Pt wanted to wrestle this weekend  Will attempt power wrestling movements 1/16 and prepare to wrestle on 1/17  LB ATC    1/12/23  Pt is prepared to weight in but not wrestle  He was in discomfort during the flexion portion of VAC  LB ATC    1/11/23  Pt states that his Left knee felt good during stance and motion yesterday    KOOS: 51  LB ATC

## 2023-02-13 ENCOUNTER — ATHLETIC TRAINING (OUTPATIENT)
Dept: SPORTS MEDICINE | Facility: OTHER | Age: 23
End: 2023-02-13

## 2023-02-13 DIAGNOSIS — S89.92XA ACUTE INJURY OF LEFT ANTERIOR CRUCIATE LIGAMENT, INITIAL ENCOUNTER: Primary | ICD-10-CM

## 2023-02-13 DIAGNOSIS — S63.630A SPRAIN OF INTERPHALANGEAL JOINT OF RIGHT INDEX FINGER, INITIAL ENCOUNTER: Primary | ICD-10-CM

## 2023-02-14 NOTE — PROGRESS NOTES
Athletic Training Knee Evaluation    Name: Savi Singh  Age: 20 y o  Date of Assessment: 1/11/2023    Assessment/Plan:     Visit Diagnosis: Pain and swelling of knee, left [M25 562, M25 462]    Treatment Plan: Attempt to regain function for competition    []  Follow-up PRN  []  Follow-up prior to next practice/game for re-evaluation  [x]  Daily treatment/rehab  Progress note expected weekly       Referral:     []  Not needed at this time  []  Referred to:     []  Coaching staff notified  []  Parent/Guardian Notified    Subjective:    Date of Injury: 1/7/23    Injury occurred during:     []  Practice  [x]  Competition  []  Other:     Mechanism: Hopping on one leg with a person holding on to his Right leg    Previous History: MCL and LCL sprain    Reported Symptoms:     [] Felt pop [] Grinding   [x] Bear Higashi a pop [] Pressure   [] Pain with rest [] Burning   [] Pain with activity [] Weakness   [] Pain with stairs [] Loss of motion   [x] Sharp pain [] Clicking   [] Dull pain [x] Snapping sensation   [] Radiating pain [] Locking   [] Felt give way       Objective:    Observation:     []  No observable findings compared bilaterally    [x] Swelling [] Genu recurvatum   [x] Effusion [] Genu valgum   [] Deformity [] Genu varus   [] Ecchymosis [] Patella leonel   [] Abnormal gait [] Patella baja   [] Atrophy [] Squinting patellae   [] Muscle spasm [] “Frog eye” patellae     Palpation: TTP anterior and posterior knee    Active Range of Motion:      Full  ROM Limited  ROM Pain  with  ROM No  Motion   Knee Flexion [] [x] [] []   Knee Extension [x] [] [] []   Hip Flexion [] [] [] []   Hip Extension [] [] [] []   Hip Abduction [] [] [] []   Hip Adduction [] [] [] []   Dorsiflexion [] [] [] []   Plantarflexion [] [] [] []     Manual Muscle Tests:     Not performed []             5 4+ 4 4- 3 or  Under   Knee Flexion [] [] [] [] []   Knee Extension [] [] [] [] []   Hip Flexion [] [] [] [] []   Hip Extension [] [] [] [] [] Hip Abduction [] [] [] [] []   Hip Adduction [] [] [] [] []   Dorsiflexion [] [] [] [] []   Plantarflexion [] [] [] [] []     Special Tests:      (+)  Laxity (+)  Pain (-)  WNL Not  Tested   Lachman [x] [x] [] []   Anterior Drawer [x] [x] [] []   Pivot Shift [] [] [] []   Posterior Drawer [] [] [] []   Sag [] [] [] []   Valgus (0 Degrees) [] [] [] []   Valgus (30 Degrees) [] [] [] []   Varus (0 Degrees) [] [] [] []   Varus (30 Degrees) [] [] [] []   Seferino [] [] [] []   Thessally's [] [] [] []   Apley's [] [] [] []   Opal's [] [] [] []   Patellar Apprehension [] [] [] []   Patellar Glide [] [] [] []   Ballotable Patella  [] [] [] []     Treatment Log:     Date: 1/9/23   Playing Status: Out       Exercise/Treatment    Hopping 2x2   Knee extension 2x6                                       LB ATC    Athletic Training Progress Note    Name: Ольга Haddad  Age: 20 y o  Assessment/Plan:     Visit Diagnosis: Pain and swelling of knee, left [M25 562, M25 462]    Treatment Plan: Progress from stance and motion    []  Follow-up PRN  []  Follow-up prior to next practice/game for re-evaluation  [x]  Daily treatment/rehab  Progress note expected weekly  Subjective: Pt felt more unstable after working but then more stable with compression wrap  Objective:   See treatment log below    Treatment Log:     Date: 2/6 1/16 1/15 1/12 1/11/23 1/10/23   Playing Status: As tolerated As tolerated Limited Limited Limited Limited            Exercise/Treatment         VAC extension  2x5 bounding 2x5 hopping 2x5 2x5 2x5   Hopping    3x2 3x2 3x2   Hip abduction   3x7  3x7 2x5   Squats    3x4     Functional Power moves   2x2      Knee Flexion   3x4      Laying lift off x5        Kneeling lift off x5        Standing lift off x5                          LB ATC    2/13  Completed KOOS  Exercises were progressed to more hip shifting responses  LB ATC    2/3  Pt has competed in all but one match since returning   He feels sore and tight  Completed function exercises  LB ATC    1/17/23  KOOS: 72   LB ATC    1/16/23  Pt completed shooting, sprawling, second shot, mat return, and escape explosion with limited problems a practice will attempt to wrestling tomorrow night  Dr Arthur Robles believes he tore his ACL  LB ATC    1/15/23  Pt wanted to wrestle this weekend  Will attempt power wrestling movements 1/16 and prepare to wrestle on 1/17  LB ATC    1/12/23  Pt is prepared to weight in but not wrestle  He was in discomfort during the flexion portion of VAC  LB ATC    1/11/23  Pt states that his Left knee felt good during stance and motion yesterday    KOOS: 51  LB ATC

## 2023-02-14 NOTE — PROGRESS NOTES
Athletic Training Wrist/Hand Evaluation    Name: Ryan Rossi  Age: 20 y o  Date of Assessment: 2/13/2023    Assessment/Plan:     Visit Diagnosis: No primary diagnosis found  Treatment Plan: If injury continues to bother him then he will come in for treatment  []  Follow-up PRN  [x]  Follow-up prior to next practice/game for re-evaluation  []  Daily treatment/rehab  Progress note expected weekly  Referral:     [x]  Not needed at this time  []  Referred to:     []  Coaching staff notified  []  Parent/Guardian Notified    Subjective:    Date of Injury: 1/24/23    Injury occurred during:     [x]  Practice  []  Competition  []  Other:     Mechanism:  Athlete stated that he got stepped on by his teammate during practice         Reported Symptoms:     [] Hyperextension [] Numbness or tingling   [] Hyperflexion [] Weakness   [] Snapping sensation [] Grinding   [] Felt pop [x] Sharp pain   [] Pain with rest [] Burning   [x] Pain with activity [] Dull or achy   [] Loss of motion       Objective:    Observation:     []  No observable findings compared bilaterally    [x] Swelling [] Jersey finger   [x] Ecchymosis [] Mallet finger   [] Atrophy [] Abnormal contours   [] Callous or blister [] Nail abnormality   [] Deformity [] Subungual hematoma   [] Boutonniere deformity [] Ingrown nail   [] Dewy Rose neck deformity [] Laceration     Palpation: TTP around the PIP    Active Range of Motion:      Full  ROM Limited  ROM Pain  with  ROM No  Motion   Wrist Flexion [] [] [] []   Wrist Extension [] [] [] []   Pronation [] [] [] []   Supination [] [] [] []   Radial Deviation [] [] [] []   Ulnar Deviation [] [] [] []   Thumb Flexion [] [] [] []   Thumb Extension [] [] [] []   Thumb Abduction [] [] [] []   Thumb Adduction [] [] [] []   MP Flexion [] [] [] []   MP Extension [] [] [] []   PIP Flexion [] [] [x] []   PIP Extension [x] [] [] []   DIP Flexion [] [] [] []   DIP Extension [] [] [] []     Manual Muscle Tests: Not performed []             5 4+ 4 4- 3 or  Under   Wrist Flexion [] [] [] [] []   Wrist Extension [] [] [] [] []   Pronation [] [] [] [] []   Supination [] [] [] [] []   Radial Deviation [] [] [] [] []   Ulnar Deviation [] [] [] [] []   Thumb Flexion [] [] [] [] []   Thumb Extension [] [] [] [] []   Thumb Abduction [] [] [] [] []   Thumb Adduction [] [] [] [] []   MP Flexion [] [] [] [] []   MP Extension [] [] [] [] []   PIP Flexion [] [] [x] [] []   PIP Extension [x] [] [] [] []   DIP Flexion [] [] [] [] []   DIP Extension [] [] [] [] []     Special Tests:      (+)  Laxity (+)  Pain (-)  WNL Not  Tested   Compression [] [x] [] []   Distraction [] [] [] []   Percussion [] [x] [] []   Tuning Fork [] [] [] []   Valgus Stress [] [] [] []   Varus Stress [] [] [] []   Wrist Glide [] [] [] []   Tinel's [] [] [] []   Phalen's [] [] [] []   Reverse Phalen's [] [] [] []   Finkelstein's [] [] [] []   Berumen Scaphoid Shift [] [] [] []   Triangular Fibrocartilage [] [] [] []   Lunotriquetrial Shear [] [] [] []     Treatment Log:     Date:    Playing Status:        Exercise/Treatment                                                   Will continue to monitor and make sure that his injury is getting better  ECATS  LB ATC    2/13  No further issues    LB ATC

## 2023-02-16 ENCOUNTER — ATHLETIC TRAINING (OUTPATIENT)
Dept: SPORTS MEDICINE | Facility: OTHER | Age: 23
End: 2023-02-16

## 2023-02-16 DIAGNOSIS — S89.92XA ACUTE INJURY OF LEFT ANTERIOR CRUCIATE LIGAMENT, INITIAL ENCOUNTER: Primary | ICD-10-CM

## 2023-02-17 NOTE — PROGRESS NOTES
Athletic Training Knee Evaluation    Name: Ryan Rossi  Age: 20 y o  Date of Assessment: 1/11/2023    Assessment/Plan:     Visit Diagnosis: Pain and swelling of knee, left [M25 562, M25 462]    Treatment Plan: Attempt to regain function for competition    []  Follow-up PRN  []  Follow-up prior to next practice/game for re-evaluation  [x]  Daily treatment/rehab  Progress note expected weekly       Referral:     []  Not needed at this time  []  Referred to:     []  Coaching staff notified  []  Parent/Guardian Notified    Subjective:    Date of Injury: 1/7/23    Injury occurred during:     []  Practice  [x]  Competition  []  Other:     Mechanism: Hopping on one leg with a person holding on to his Right leg    Previous History: MCL and LCL sprain    Reported Symptoms:     [] Felt pop [] Grinding   [x] Jacque Dark a pop [] Pressure   [] Pain with rest [] Burning   [] Pain with activity [] Weakness   [] Pain with stairs [] Loss of motion   [x] Sharp pain [] Clicking   [] Dull pain [x] Snapping sensation   [] Radiating pain [] Locking   [] Felt give way       Objective:    Observation:     []  No observable findings compared bilaterally    [x] Swelling [] Genu recurvatum   [x] Effusion [] Genu valgum   [] Deformity [] Genu varus   [] Ecchymosis [] Patella leonel   [] Abnormal gait [] Patella baja   [] Atrophy [] Squinting patellae   [] Muscle spasm [] “Frog eye” patellae     Palpation: TTP anterior and posterior knee    Active Range of Motion:      Full  ROM Limited  ROM Pain  with  ROM No  Motion   Knee Flexion [] [x] [] []   Knee Extension [x] [] [] []   Hip Flexion [] [] [] []   Hip Extension [] [] [] []   Hip Abduction [] [] [] []   Hip Adduction [] [] [] []   Dorsiflexion [] [] [] []   Plantarflexion [] [] [] []     Manual Muscle Tests:     Not performed []             5 4+ 4 4- 3 or  Under   Knee Flexion [] [] [] [] []   Knee Extension [] [] [] [] []   Hip Flexion [] [] [] [] []   Hip Extension [] [] [] [] [] Hip Abduction [] [] [] [] []   Hip Adduction [] [] [] [] []   Dorsiflexion [] [] [] [] []   Plantarflexion [] [] [] [] []     Special Tests:      (+)  Laxity (+)  Pain (-)  WNL Not  Tested   Lachman [x] [x] [] []   Anterior Drawer [x] [x] [] []   Pivot Shift [] [] [] []   Posterior Drawer [] [] [] []   Sag [] [] [] []   Valgus (0 Degrees) [] [] [] []   Valgus (30 Degrees) [] [] [] []   Varus (0 Degrees) [] [] [] []   Varus (30 Degrees) [] [] [] []   Seferino [] [] [] []   Thessally's [] [] [] []   Apley's [] [] [] []   Opal's [] [] [] []   Patellar Apprehension [] [] [] []   Patellar Glide [] [] [] []   Ballotable Patella  [] [] [] []     Treatment Log:     Date: 1/9/23   Playing Status: Out       Exercise/Treatment    Hopping 2x2   Knee extension 2x6                                       LB ATC    Athletic Training Progress Note    Name: Jeniffer Hannah  Age: 20 y o  Assessment/Plan:     Visit Diagnosis: Pain and swelling of knee, left [M25 562, M25 462]    Treatment Plan: Progress from stance and motion    []  Follow-up PRN  []  Follow-up prior to next practice/game for re-evaluation  [x]  Daily treatment/rehab  Progress note expected weekly  Subjective: Pt felt more unstable after working but then more stable with compression wrap  Objective:   See treatment log below    Treatment Log:     Date: 2/6 1/16 1/15 1/12 1/11/23 1/10/23   Playing Status: As tolerated As tolerated Limited Limited Limited Limited            Exercise/Treatment         VAC extension  2x5 bounding 2x5 hopping 2x5 2x5 2x5   Hopping    3x2 3x2 3x2   Hip abduction   3x7  3x7 2x5   Squats    3x4     Functional Power moves   2x2      Knee Flexion   3x4      Laying lift off x5        Kneeling lift off x5        Standing lift off x5                          LB ATC    2/16  KOOS: 65  LB ATC    2/13  Completed KOOS  Exercises were progressed to more hip shifting responses    LB ATC    2/3  Pt has competed in all but one match since returning  He feels sore and tight  Completed function exercises  LB ATC    1/17/23  KOOS: 72   LB ATC    1/16/23  Pt completed shooting, sprawling, second shot, mat return, and escape explosion with limited problems a practice will attempt to wrestling tomorrow night  Dr Kristel Villa believes he tore his ACL  LB ATC    1/15/23  Pt wanted to wrestle this weekend  Will attempt power wrestling movements 1/16 and prepare to wrestle on 1/17  LB ATC    1/12/23  Pt is prepared to weight in but not wrestle  He was in discomfort during the flexion portion of VAC  LB ATC    1/11/23  Pt states that his Left knee felt good during stance and motion yesterday    KOOS: 51  LB ATC

## 2023-03-03 ENCOUNTER — HOSPITAL ENCOUNTER (OUTPATIENT)
Dept: MRI IMAGING | Facility: HOSPITAL | Age: 23
End: 2023-03-03

## 2023-03-03 DIAGNOSIS — S83.512A RUPTURE OF ANTERIOR CRUCIATE LIGAMENT OF LEFT KNEE, INITIAL ENCOUNTER: ICD-10-CM

## 2023-03-03 DIAGNOSIS — S89.92XA INJURY OF LEFT KNEE, INITIAL ENCOUNTER: ICD-10-CM

## 2023-03-06 NOTE — PROGRESS NOTES
3/6  Note from  Emanuel Dominguez: Jackie Torres was discouraged to hear that there was not another tissue involved causing him his hyperextension  He had been under the impression that he most likely tore his ACL and he decided to continue to wrestle through the pain and instability  He would benefit from a thorough explanation of the severity of a tibial plateau contusion and a clinical assessment that rules out ACL involvement    LB ATC

## 2023-03-08 ENCOUNTER — CONSULT (OUTPATIENT)
Dept: OBGYN CLINIC | Facility: OTHER | Age: 23
End: 2023-03-08

## 2023-03-08 VITALS
DIASTOLIC BLOOD PRESSURE: 81 MMHG | HEIGHT: 74 IN | SYSTOLIC BLOOD PRESSURE: 130 MMHG | BODY MASS INDEX: 32.73 KG/M2 | WEIGHT: 255 LBS | HEART RATE: 67 BPM

## 2023-03-08 DIAGNOSIS — S83.512A RUPTURE OF ANTERIOR CRUCIATE LIGAMENT OF LEFT KNEE, INITIAL ENCOUNTER: Primary | ICD-10-CM

## 2023-03-08 RX ORDER — CHLORHEXIDINE GLUCONATE 0.12 MG/ML
15 RINSE ORAL ONCE
OUTPATIENT
Start: 2023-03-08 | End: 2023-03-08

## 2023-03-08 NOTE — H&P (VIEW-ONLY)
"Orthopaedic Surgery - Office Note  Michelle Curry (20 y o  male)   : 2000   MRN: [id number]  Encounter Date: 3/8/2023    Chief Complaint   Patient presents with   • Left Knee - Pain, Swelling, Numbness       Assessment / Plan  Left knee pain and instability, likely partial ACL tear, and tibial plateau bone contusion without obvious fracture    · The imaging and diagnosis were reviewed at length with the patient and his mother  · On my review of the MRI, I feel that there is possibly a partial ACL tear in addition to the tibial bone contusion  · We discussed surgical and nonsurgical treatment options  · Patient wishes to proceed with left knee arthroscopy with ACL debridement vs reconstruction  · Risks, benefits, and alternatives were reviewed  · Consent was signed  · No follow-ups on file  History of Present Illness  Michelle Curry is a 20 y o  male who presents for evaluation of a left knee injury sustained on 23 while wrestling  He reports significant left knee pain and subjective instability  He had a suspected ACL tear at that time and decided to treat with rehab and a brace 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  He was able to complete his season, but he had significant knee pain and recurrent instability despite using a brace  His season is now over  He had an MRI that was read as tibial bone contusion and no obvious ligament injury  Review of Systems  Pertinent items are noted in HPI  All other systems were reviewed and are negative  Physical Exam  /81   Pulse 67   Ht 6' 2\" (1 88 m)   Wt 116 kg (255 lb)   BMI 32 74 kg/m²   Cons: Appears well  No apparent distress  Psych: Alert  Oriented x3  Mood and affect normal   Eyes: PERRLA, EOMI  Resp: Normal effort  No audible wheezing or stridor  CV: Palpable pulse  No discernable arrhythmia  No LE edema  Lymph:  No palpable cervical, axillary, or inguinal lymphadenopathy  Skin: Warm    No palpable " masses  No visible lesions  Neuro: Normal muscle tone  Normal and symmetric DTR's  Left Knee Exam  Alignment:  Normal knee alignment  Inspection:  mild swelling  Palpation:  no focal tenderness  small effusion  ROM:  Knee Extension 0  Knee Flexion 135  Strength:  5/5 quadriceps and hamstrings  Stability:  (+) Lachman (Grade 1B)  (-) Varus instability  (-) Valgus instability  (-) Posterior drawer  Tests:  (-) Seferino  Patella:  Patella tracks centrally without crepitus  Neurovascular:  Sensation intact in DP/SP/Ng/Sa/T nerve distributions  2+ DP & PT pulses  Gait:  Smooth  Heel-to-toe  Studies Reviewed  I have personally reviewed pertinent films in PACS  MRI of left knee - abnormal appearance of ACL concerning for partial tear; bone edema in the anterolateral tibial plateau consistent with bone contusion    Procedures  No procedures today  Medical, Surgical, Family, and Social History  The patient's medical history, family history, and social history, were reviewed and updated as appropriate  History reviewed  No pertinent past medical history  History reviewed  No pertinent surgical history  History reviewed  No pertinent family history  Social History     Occupational History   • Not on file   Tobacco Use   • Smoking status: Never   • Smokeless tobacco: Never   Vaping Use   • Vaping Use: Never used   Substance and Sexual Activity   • Alcohol use: Yes   • Drug use: Never   • Sexual activity: Never       No Known Allergies    No current outpatient medications on file        Kelly Lynne MD    Scribe Attestation    I,:   am acting as a scribe while in the presence of the attending physician :       I,:   personally performed the services described in this documentation    as scribed in my presence :

## 2023-03-08 NOTE — PROGRESS NOTES
Orthopaedic Surgery - Office Note  Jenn Wilson (20 y o  male)   : 2000   MRN: [id number]  Encounter Date: 3/8/2023    Chief Complaint   Patient presents with   • Left Knee - Pain, Swelling, Numbness       Assessment / Plan  Left knee pain and instability, likely partial ACL tear, and tibial plateau bone contusion without obvious fracture    · The imaging and diagnosis were reviewed at length with the patient and his mother  · On my review of the MRI, I feel that there is possibly a partial ACL tear in addition to the tibial bone contusion  · We discussed surgical and nonsurgical treatment options  · Patient wishes to proceed with left knee arthroscopy with ACL debridement vs reconstruction  · Risks, benefits, and alternatives were reviewed  · Consent was signed  · No follow-ups on file  History of Present Illness  Jenn Wilson is a 20 y o  male who presents for evaluation of a left knee injury sustained on 23 while wrestling  He reports significant left knee pain and subjective instability  He had a suspected ACL tear at that time and decided to treat with rehab and a brace 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  He was able to complete his season, but he had significant knee pain and recurrent instability despite using a brace  His season is now over  He had an MRI that was read as tibial bone contusion and no obvious ligament injury  Review of Systems  Pertinent items are noted in HPI  All other systems were reviewed and are negative  Physical Exam  /81   Pulse 67   Ht 6' 2" (1 88 m)   Wt 116 kg (255 lb)   BMI 32 74 kg/m²   Cons: Appears well  No apparent distress  Psych: Alert  Oriented x3  Mood and affect normal   Eyes: PERRLA, EOMI  Resp: Normal effort  No audible wheezing or stridor  CV: Palpable pulse  No discernable arrhythmia  No LE edema  Lymph:  No palpable cervical, axillary, or inguinal lymphadenopathy  Skin: Warm    No palpable masses  No visible lesions  Neuro: Normal muscle tone  Normal and symmetric DTR's  Left Knee Exam  Alignment:  Normal knee alignment  Inspection:  mild swelling  Palpation:  no focal tenderness  small effusion  ROM:  Knee Extension 0  Knee Flexion 135  Strength:  5/5 quadriceps and hamstrings  Stability:  (+) Lachman (Grade 1B)  (-) Varus instability  (-) Valgus instability  (-) Posterior drawer  Tests:  (-) Seferino  Patella:  Patella tracks centrally without crepitus  Neurovascular:  Sensation intact in DP/SP/Ng/Sa/T nerve distributions  2+ DP & PT pulses  Gait:  Smooth  Heel-to-toe  Studies Reviewed  I have personally reviewed pertinent films in PACS  MRI of left knee - abnormal appearance of ACL concerning for partial tear; bone edema in the anterolateral tibial plateau consistent with bone contusion    Procedures  No procedures today  Medical, Surgical, Family, and Social History  The patient's medical history, family history, and social history, were reviewed and updated as appropriate  History reviewed  No pertinent past medical history  History reviewed  No pertinent surgical history  History reviewed  No pertinent family history  Social History     Occupational History   • Not on file   Tobacco Use   • Smoking status: Never   • Smokeless tobacco: Never   Vaping Use   • Vaping Use: Never used   Substance and Sexual Activity   • Alcohol use: Yes   • Drug use: Never   • Sexual activity: Never       No Known Allergies    No current outpatient medications on file        Alex Vyas MD    Scribe Attestation    I,:   am acting as a scribe while in the presence of the attending physician :       I,:   personally performed the services described in this documentation    as scribed in my presence :

## 2023-03-24 NOTE — PRE-PROCEDURE INSTRUCTIONS
No outpatient medications have been marked as taking for the 3/30/23 encounter [facility] HOSPITAL Encounter)  Medication instructions for day surgery reviewed  Please use only a sip of water to take your instructed medications  Avoid all over the counter vitamins, supplements and NSAIDS for one week prior to surgery per anesthesia guidelines  Tylenol is ok to take as needed  You will receive a call one business day prior to surgery with an arrival time and hospital directions  If your surgery is scheduled on a Monday, the hospital will be calling you on the Friday prior to your surgery  If you have not heard from anyone by 8pm, please call the hospital supervisor through the hospital  at [phone]  Geovanna Tasia [phone])  Do not eat or drink anything after midnight the night before your surgery, including candy, mints, lifesavers, or chewing gum  Do not drink alcohol 24hrs before your surgery  Try not to smoke at least 24hrs before your surgery  Follow the pre surgery showering instructions as listed in the [facility] Surgical Experience Booklet” or otherwise provided by your surgeon's office  Do not shave the surgical area 24 hours before surgery  Do not apply any lotions, creams, including makeup, cologne, deodorant, or perfumes after showering on the day of your surgery  No contact lenses, eye make-up, or artificial eyelashes  Remove nail polish, including gel polish, and any artificial, gel, or acrylic nails if possible  Remove all jewelry including rings and body piercing jewelry  Wear causal clothing that is easy to take on and off  Consider your type of surgery  Keep any valuables, jewelry, piercings at home  Please bring any specially ordered equipment (sling, braces) if indicated  Arrange for a responsible person to drive you to and from the hospital on the day of your surgery  Visitor Guidelines discussed       Call the surgeon's office with any new illnesses, exposures, or additional questions prior to surgery  Please reference your [facility] Surgical Experience Booklet” for additional information to prepare for your upcoming surgery

## 2023-03-28 ENCOUNTER — ANESTHESIA EVENT (OUTPATIENT)
Dept: PERIOP | Facility: HOSPITAL | Age: 23
End: 2023-03-28

## 2023-03-30 ENCOUNTER — ANESTHESIA (OUTPATIENT)
Dept: PERIOP | Facility: HOSPITAL | Age: 23
End: 2023-03-30

## 2023-03-30 ENCOUNTER — HOSPITAL ENCOUNTER (OUTPATIENT)
Facility: HOSPITAL | Age: 23
Setting detail: OUTPATIENT SURGERY
Discharge: HOME | End: 2023-03-30
Attending: ORTHOPAEDIC SURGERY | Admitting: ORTHOPAEDIC SURGERY

## 2023-03-30 VITALS
HEART RATE: 70 BPM | BODY MASS INDEX: 33.41 KG/M2 | RESPIRATION RATE: 16 BRPM | SYSTOLIC BLOOD PRESSURE: 125 MMHG | WEIGHT: 260.36 LBS | DIASTOLIC BLOOD PRESSURE: 57 MMHG | OXYGEN SATURATION: 97 % | TEMPERATURE: 97.9 F | HEIGHT: 74 IN

## 2023-03-30 DIAGNOSIS — S83.512A RUPTURE OF ANTERIOR CRUCIATE LIGAMENT OF LEFT KNEE, INITIAL ENCOUNTER: Primary | ICD-10-CM

## 2023-03-30 RX ORDER — CHLORHEXIDINE GLUCONATE 0.12 MG/ML
15 RINSE ORAL ONCE
Status: COMPLETED | OUTPATIENT
Start: 2023-03-30 | End: 2023-03-30

## 2023-03-30 RX ORDER — DEXMEDETOMIDINE HYDROCHLORIDE 100 UG/ML
INJECTION, SOLUTION INTRAVENOUS AS NEEDED
Status: DISCONTINUED | OUTPATIENT
Start: 2023-03-30 | End: 2023-03-30

## 2023-03-30 RX ORDER — HYDROMORPHONE HCL/PF 1 MG/ML
0.5 SYRINGE (ML) INJECTION
Status: DISCONTINUED | OUTPATIENT
Start: 2023-03-30 | End: 2023-03-30 | Stop reason: HOSPADM

## 2023-03-30 RX ORDER — OXYCODONE HYDROCHLORIDE 5 MG/1
5 TABLET ORAL EVERY 4 HOURS PRN
Status: DISCONTINUED | OUTPATIENT
Start: 2023-03-30 | End: 2023-03-30 | Stop reason: HOSPADM

## 2023-03-30 RX ORDER — ONDANSETRON 2 MG/ML
4 INJECTION INTRAMUSCULAR; INTRAVENOUS ONCE AS NEEDED
Status: DISCONTINUED | OUTPATIENT
Start: 2023-03-30 | End: 2023-03-30 | Stop reason: HOSPADM

## 2023-03-30 RX ORDER — SODIUM CHLORIDE, SODIUM LACTATE, POTASSIUM CHLORIDE, CALCIUM CHLORIDE 600; 310; 30; 20 MG/100ML; MG/100ML; MG/100ML; MG/100ML
125 INJECTION, SOLUTION INTRAVENOUS CONTINUOUS
Status: DISCONTINUED | OUTPATIENT
Start: 2023-03-30 | End: 2023-03-30 | Stop reason: HOSPADM

## 2023-03-30 RX ORDER — FENTANYL CITRATE 50 UG/ML
INJECTION, SOLUTION INTRAMUSCULAR; INTRAVENOUS AS NEEDED
Status: DISCONTINUED | OUTPATIENT
Start: 2023-03-30 | End: 2023-03-30

## 2023-03-30 RX ORDER — OXYCODONE HYDROCHLORIDE 5 MG/1
10 TABLET ORAL EVERY 4 HOURS PRN
Status: DISCONTINUED | OUTPATIENT
Start: 2023-03-30 | End: 2023-03-30 | Stop reason: HOSPADM

## 2023-03-30 RX ORDER — OXYCODONE HYDROCHLORIDE 5 MG/1
5 TABLET ORAL EVERY 4 HOURS PRN
Qty: 30 TABLET | Refills: 0 | Status: SHIPPED | OUTPATIENT
Start: 2023-03-30 | End: 2023-04-09

## 2023-03-30 RX ORDER — DEXAMETHASONE SODIUM PHOSPHATE 10 MG/ML
INJECTION, SOLUTION INTRAMUSCULAR; INTRAVENOUS AS NEEDED
Status: DISCONTINUED | OUTPATIENT
Start: 2023-03-30 | End: 2023-03-30

## 2023-03-30 RX ORDER — MEPERIDINE HYDROCHLORIDE 25 MG/ML
12.5 INJECTION INTRAMUSCULAR; INTRAVENOUS; SUBCUTANEOUS
Status: DISCONTINUED | OUTPATIENT
Start: 2023-03-30 | End: 2023-03-30 | Stop reason: HOSPADM

## 2023-03-30 RX ORDER — MIDAZOLAM HYDROCHLORIDE 2 MG/2ML
INJECTION, SOLUTION INTRAMUSCULAR; INTRAVENOUS AS NEEDED
Status: DISCONTINUED | OUTPATIENT
Start: 2023-03-30 | End: 2023-03-30

## 2023-03-30 RX ORDER — NAPROXEN 500 MG/1
500 TABLET ORAL 2 TIMES DAILY WITH MEALS
Qty: 60 TABLET | Refills: 0 | Status: SHIPPED | OUTPATIENT
Start: 2023-03-30 | End: 2023-04-29

## 2023-03-30 RX ORDER — ONDANSETRON 2 MG/ML
INJECTION INTRAMUSCULAR; INTRAVENOUS AS NEEDED
Status: DISCONTINUED | OUTPATIENT
Start: 2023-03-30 | End: 2023-03-30

## 2023-03-30 RX ORDER — CEFAZOLIN SODIUM 2 G/50ML
2000 SOLUTION INTRAVENOUS ONCE
Status: COMPLETED | OUTPATIENT
Start: 2023-03-30 | End: 2023-03-30

## 2023-03-30 RX ORDER — ROPIVACAINE HYDROCHLORIDE 5 MG/ML
INJECTION, SOLUTION EPIDURAL; INFILTRATION; PERINEURAL AS NEEDED
Status: DISCONTINUED | OUTPATIENT
Start: 2023-03-30 | End: 2023-03-30

## 2023-03-30 RX ORDER — ONDANSETRON 4 MG/1
4 TABLET, FILM COATED ORAL EVERY 8 HOURS PRN
Qty: 15 TABLET | Refills: 0 | Status: SHIPPED | OUTPATIENT
Start: 2023-03-30

## 2023-03-30 RX ORDER — FENTANYL CITRATE/PF 50 MCG/ML
25 SYRINGE (ML) INJECTION
Status: DISCONTINUED | OUTPATIENT
Start: 2023-03-30 | End: 2023-03-30 | Stop reason: HOSPADM

## 2023-03-30 RX ORDER — PROPOFOL 10 MG/ML
INJECTION, EMULSION INTRAVENOUS AS NEEDED
Status: DISCONTINUED | OUTPATIENT
Start: 2023-03-30 | End: 2023-03-30

## 2023-03-30 RX ADMIN — DEXMEDETOMIDINE HCL 20 MCG: 100 INJECTION INTRAVENOUS at 08:08

## 2023-03-30 RX ADMIN — SODIUM CHLORIDE, SODIUM LACTATE, POTASSIUM CHLORIDE, AND CALCIUM CHLORIDE 125 ML/HR: .6; .31; .03; .02 INJECTION, SOLUTION INTRAVENOUS at 05:43

## 2023-03-30 RX ADMIN — MIDAZOLAM 6 MG: 1 INJECTION INTRAMUSCULAR; INTRAVENOUS at 07:09

## 2023-03-30 RX ADMIN — ROPIVACAINE HYDROCHLORIDE 20 ML: 5 INJECTION EPIDURAL; INFILTRATION; PERINEURAL at 07:15

## 2023-03-30 RX ADMIN — ONDANSETRON 4 MG: 2 INJECTION INTRAMUSCULAR; INTRAVENOUS at 08:02

## 2023-03-30 RX ADMIN — DEXMEDETOMIDINE HCL 20 MCG: 100 INJECTION INTRAVENOUS at 07:39

## 2023-03-30 RX ADMIN — FENTANYL CITRATE 100 MCG: 50 INJECTION INTRAMUSCULAR; INTRAVENOUS at 07:09

## 2023-03-30 RX ADMIN — DEXAMETHASONE SODIUM PHOSPHATE 10 MG: 10 INJECTION INTRAMUSCULAR; INTRAVENOUS at 07:50

## 2023-03-30 RX ADMIN — PROPOFOL 350 MG: 10 INJECTION, EMULSION INTRAVENOUS at 07:33

## 2023-03-30 RX ADMIN — FENTANYL CITRATE 50 MCG: 50 INJECTION INTRAMUSCULAR; INTRAVENOUS at 07:49

## 2023-03-30 RX ADMIN — CHLORHEXIDINE GLUCONATE 0.12% ORAL RINSE 15 ML: 1.2 LIQUID ORAL at 05:47

## 2023-03-30 RX ADMIN — CEFAZOLIN SODIUM 2000 MG: 2 SOLUTION INTRAVENOUS at 07:25

## 2023-03-30 RX ADMIN — ROPIVACAINE HYDROCHLORIDE 20 ML: 5 INJECTION EPIDURAL; INFILTRATION; PERINEURAL at 07:12

## 2023-03-30 NOTE — ANESTHESIA PREPROCEDURE EVALUATION
Procedure:  ARTHROSCOPIC RECONSTRUCTION (ACL) W/ AUTOGRAFT (Left: Knee)    Relevant Problems   No relevant active problems        Physical Exam    Airway    Mallampati score: I  TM Distance: >3 FB  Neck ROM: full     Dental   No notable dental hx     Cardiovascular  Rhythm: regular, Rate: normal, Cardiovascular exam normal    Pulmonary  Pulmonary exam normal     Other Findings        Anesthesia Plan  ASA Score- 1     Anesthesia Type- general with ASA Monitors  Additional Monitors:   Airway Plan:     Comment: F/S nerve blocks d/w pt consent obtained   Plan Factors-Exercise tolerance (METS): >4 METS  Chart reviewed  Existing labs reviewed  Patient summary reviewed  Patient is not a current smoker  Obstructive sleep apnea risk education given perioperatively  Induction- intravenous  Postoperative Plan-     Informed Consent- Anesthetic plan and risks discussed with patient

## 2023-03-30 NOTE — INTERVAL H&P NOTE
H&P reviewed  After examining the patient I find no changes in the patients condition since the H&P had been written      Vitals:    03/30/23 0715   BP: 134/62   Pulse: 98   Resp:    Temp:    SpO2: 98%

## 2023-03-30 NOTE — DISCHARGE INSTR - AVS FIRST PAGE
POSTOPERATIVE INSTRUCTIONS following KNEE SURGERY    MEDICATIONS:  Resume all home medications unless otherwise instructed by your surgeon  Pain Medication:  Oxycodone 5 mg, 1-3 tablets every 3 hours as needed  If you were given a regional anesthetic (nerve block), please begin taking the pain medication as soon as you get home, even if you have minimal or no pain  DO NOT WAIT FOR THE NERVE BLOCK TO WEAR OFF  Possible side effects include nausea, constipation, and urinary retention  If you experience these side effects, please call our office for assistance  Pain med refills are authorized only during office hours (8am-4pm, Mon-Fri)  Anti-Inflammatory:  Naproxen 500 mg, 1 tablet every 12 hours for 4 weeks  Take with food  Stop if you experience nausea, reflux, or stomach pain  Nausea Medication:  Zofran ODT 4 mg, 1 tablet every 6 hours as needed  Fill prescription ONLY if you expericnce severe nausea  Blood Clot Prevention:  Not Necessary  Pump your foot up and down 20 times per hour while you are less mobile  WOUND CARE:  Keep the dressing clean and dry  Light drainage may occur the first 2 days postop  You may remove the dressings and get the incision wet in the shower 72 hours after surgery  DO NOT remove steri-strips or sutures  DO NOT immerse the incision under water  Carefully pat the incision dry  If there is wound drainage, re-apply a fresh dry gauze dressing  Please call our office ([phone]) if you experience either of the following:  Sudden increase in swelling, redness, or warmth at the surgical site  Excessive incisional drainage that persists beyond the 3rd day after surgery  Oral temperature greater than 101 degrees, not relieved with Tylenol  Shortness of breath, chest pain, nausea, or any other concerning symptoms    SWELLING CONTROL:  Cold Therapy: The cold therapy device may be used either continuously or only as needed, according to your preference    Do not let the pad "directly touch your skin  Alternatively, apply ice (20 min on, 20 min off) as often as you feel is necessary  Elevation:  Elevate the entire leg above heart level  Place pillows under your ankle to keep your knee straight  Compression:  Apply ACE wraps or a thigh-length compression stocking as needed  RANGE OF MOTION:  You are allowed FULL RANGE OF MOTION as tolerated  IMMOBILIZATION:  None  You are allowed full range of motion as tolerated  ACTIVITY:   BEAR FULL WEIGHT AS TOLERATED on the operative leg  Use crutches to assist only as needed  Using Crutches on Stairs:  Going up, lead with your \"good\" (nonoperative) leg  Going down, lead with your \"bad\" (operative) leg  Use a hand rail when available  Knee Extension:  Place a rolled towel or pillow under your ankle for 20-30 minutes 3-5 times per day  This will help to maintain full knee extension  Quad Sets:  Sit or lie with your knee straight  Tighten your quadriceps (front thigh) muscle  Hold for 3 seconds, then relax  Repeat 20 times per hour while awake  PHYSICAL THERAPY:  You will be given a physical therapy prescription when you are seen in the office for your postoperative appointment  FOLLOW-UP APPOINTMENT:  4-5 days after surgery with:    Dr Abril Whitlock, [address] [facility]  [address] [place] [address], [address] [place] [address], [place], [address] [facility]  [phone] ([facility])  [phone] (After-Hours)    "

## 2023-03-30 NOTE — ANESTHESIA PROCEDURE NOTES
Peripheral Block    Patient location during procedure: holding area  Start time: 3/30/2023 7:09 AM  Reason for block: at surgeon's request and post-op pain management  Staffing  Performed: Anesthesiologist   Anesthesiologist: Lily Jett MD  Preanesthetic Checklist  Completed: patient identified, IV checked, site marked, risks and benefits discussed, surgical consent, monitors and equipment checked, pre-op evaluation and timeout performed  Peripheral Block  Patient position: right lateral decubitus  Prep: ChloraPrep  Patient monitoring: frequent blood pressure checks, continuous pulse ox, heart rate and cardiac monitor  Block type: sciatic  Laterality: left  Injection technique: single-shot  Procedures: nerve stimulator  Needle  Needle type: Stimuplex   Needle length: 4 in  Needle localization: anatomical landmarks  Assessment  Injection assessment: incremental injection, negative aspiration for heme and no paresthesia on injection  Paresthesia pain: none  Heart rate change: no  Slow fractionated injection: yes  Post-procedure:  site cleaned  patient tolerated the procedure well with no immediate complications

## 2023-03-30 NOTE — OP NOTE
OPERATIVE REPORT    PATIENT NAME: Gauri Mehta   :  2000  MRN: [id number]  Pt Location: [facility] OR ROOM 02    SURGERY DATE: 3/30/2023    SURGEON(S) and ROLE:  Primary: Kyle Finnegan MD  Assisting: Floyd Fabian PA-C    NOTE:  The presence of a physician assistant was necessary to help with patient positioning, surgical exposure, wound retraction, wound closure, and other key portions of the procedure  No qualified resident was available for this case  PREOPERATIVE DIAGNOSES:  Right Knee  Partial ACL Tear    POSTOPERATIVE DIAGNOSES:  Right Knee  Intact ACL    PROCEDURES:  Right Knee Diagnostic Arthroscopy with ACL Debridement      ANESTHESIA TYPE:  General LMA and Adductor Block    ANESTHESIA STAFF:   Anesthesiologist: Alice Anderson MD  CRNA: Veryl Frankel, CRNA    ESTIMATED BLOOD LOSS:  5 mL    TOURNIQUET TIME:  Not used    PERIOPERATIVE ANTIBIOTICS:  cefazolin, 2 grams    IMPLANTS:  none    * No implants in log *    SPECIMENS:  * No specimens in log *    DRAINS:  None      OPERATIVE INDICATIONS:  The patient is a 20 y o  male with right knee pain and instability with concern for partial ACL tear  Surgical treatment was indicated due to persistent symptoms despite non-surgical treatment  After a thorough discussion of the potential risks, benefits, and alternative treatments, the patient agreed to proceed with surgery  The patient understands that the risks of surgery include, but are not limited to: failure of repair, infection, neurovascular injury, wound healing complications, venous thromboembolism, persistent pain, stiffness, instability, recurrence of symptoms, potential need for additional surgeries, and loss of limb or life  Oral and written consent for surgery was obtained from the patient preoperatively        EXAM UNDER ANESTHESIA:  Neutral alignment  ROM:  0-135 degrees  Ligaments stable to varus stress / valgus stress / Lachman (grade 1A and symmetric) / posterior drawer; negative pivot-shift  Patella tracking normal  without crepitus  PROCEDURE AND TECHNIQUE:  On the day of surgery, the patient was identified in the preoperative holding area  The operative site was marked by the surgeon  The patient was taken into the operating room  A time-out was conducted to confirm the patient's identity, the operative site, and the proposed procedure  The patient was anesthetized, and perioperative antibiotics were administered  The patient was positioned supine on the OR table  All bony prominences were padded  A tourniquet was not used  The operative site was prepped and draped using standard sterile technique  An anterolateral portal was established, and the arthroscope was inserted into the knee joint  An anteromedial portal was established under direct visualization, and diagnostic arthroscopy was performed  The joint demonstrated no synovitis or loose bodies  In the patellofemoral compartment, the trochlea demonstrated pristine articular cartilage  The patella demonstrated pristine articular cartilage  The patella tracking was normal        In the medial compartment, the medial femoral condyle demonstrated pristine articular cartilage  The medial tibial plateau demonstrated pristine articular cartilage  The medial meniscus was intact       In the lateral compartment, the lateral femoral condyle demonstrated pristine articular cartilage  The lateral tibial plateau demonstrated pristine articular cartilage  The lateral meniscus was intact       In the intercondylar notch, the PCL was intact  The ACL was intact and demonstrated appropriate tension of both the posterolateral and anteromedial bundles  The synovium overlying the ACL was lightly debrided to better examine the bundles  There were no signs of even a partial ACL tear  The femoral and tibial footprints were fully intact  At the conclusion of the procedure, the instruments were withdrawn    The portals and incisions were closed with absorbable sutures and steri-strips  A sterile dressing was applied  The surgical drapes were removed  A soft bandage was applied to the operative knee  The patient was awakened from anesthesia and transported to the PACU in stable condition        COMPLICATIONS:  None    PATIENT DISPOSITION:  PACU       SIGNATURE:  Alvino Rutherford MD  DATE:  March 30, 2023  TIME:  3:13 PM

## 2023-03-30 NOTE — ANESTHESIA PROCEDURE NOTES
Peripheral Block    Patient location during procedure: holding area  Start time: 3/30/2023 7:14 AM  Reason for block: at surgeon's request and post-op pain management  Staffing  Performed: Anesthesiologist   Anesthesiologist: Pérez Sigala MD  Preanesthetic Checklist  Completed: patient identified, IV checked, site marked, risks and benefits discussed, surgical consent, monitors and equipment checked, pre-op evaluation and timeout performed  Peripheral Block  Patient position: supine  Prep: ChloraPrep  Patient monitoring: frequent blood pressure checks, continuous pulse ox, cardiac monitor and heart rate  Block type: femoral  Laterality: left  Injection technique: single-shot  Procedures: ultrasound guided, Ultrasound guidance required for the procedure to increase accuracy and safety of medication placement and decrease risk of complications   and nerve stimulator  Ultrasound permanent image saved  Needle  Needle type: Stimuplex   Needle length: 4 in  Needle localization: ultrasound guidance  Test dose: negative  Assessment  Injection assessment: incremental injection, local visualized surrounding nerve on ultrasound, negative aspiration for heme and no paresthesia on injection  Paresthesia pain: none  Heart rate change: no  Slow fractionated injection: yes  Post-procedure:  site cleaned  patient tolerated the procedure well with no immediate complications

## 2023-04-05 ENCOUNTER — OFFICE VISIT (OUTPATIENT)
Dept: OBGYN CLINIC | Facility: OTHER | Age: 23
End: 2023-04-05

## 2023-04-05 VITALS
HEIGHT: 74 IN | SYSTOLIC BLOOD PRESSURE: 135 MMHG | HEART RATE: 97 BPM | DIASTOLIC BLOOD PRESSURE: 89 MMHG | BODY MASS INDEX: 33.37 KG/M2 | WEIGHT: 260 LBS

## 2023-04-05 DIAGNOSIS — Z98.890 S/P LEFT KNEE ARTHROSCOPY: Primary | ICD-10-CM

## 2023-04-05 NOTE — LETTER
April 5, 2023     Patient: Maryan Fonseca  YOB: 2000  Date of Visit: 4/5/2023      To Whom it May Concern:    Maryan Fonseca is under my professional care  Bartolo Sever was seen in my office on 4/5/2023  Bartolo Sever had surgery on his L knee on 3/30/23  He should be excused from training exercises that would involve the use of his L leg at this time  He can do sedentary activities if available  We will be seeing him back in 6 weeks for reevaluation at which time we will provide updated restrictions  If you have any questions or concerns, please don't hesitate to call           Sincerely,          Martin Capellan PA-C        CC: No Recipients

## 2023-04-05 NOTE — PROGRESS NOTES
"Orthopaedic Surgery - Office Note  Quique Garner (35 y o  male)   : 2000   MRN: [id number]  Encounter Date: 2023    No chief complaint on file  Assessment / Plan  Status post left knee diagnostic arthroscopy with ACL debridement on 3/30/2023    · Start PT we did provide him with a knee arthroscopy protocol which he can follow at this time and progress as tolerated  We did discuss that he does not have any formal restrictions as his ACL was intact and his knee was stable to exam   · Continue with ice and compression for swelling management  · Ice and analgesics/NSAIDs as needed for pain  · Weightbearing as tolerated right lower extremity  · Did provide him with a note for his  exercises that he should be excused from anything that involves the right leg and is allowed to do sedentary activity at this time if available  We will provide him with updated restrictions following his next appointment  Return in about 6 weeks (around 2023) for follow up with Dr Alex Link  History of Present Illness  Quique Garner is a 24 y o  male who presents for follow-up status post left knee diagnostic arthroscopy with ACL debridement on 3/30/2023  Patient has been doing okay since his procedure does have a lot of swelling and discomfort in the knee  He has not been bearing much weight on it using crutches  He has been icing frequently and elevating as tolerated  He denies any distal numbness or tingling at this time  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  Review of Systems  Pertinent items are noted in HPI  All other systems were reviewed and are negative  Physical Exam  /89   Pulse 97   Ht 6' 2\" (1 88 m)   Wt 118 kg (260 lb)   BMI 33 38 kg/m²   Cons: Appears well  No apparent distress  Psych: Alert  Oriented x3  Mood and affect normal   Eyes: PERRLA, EOMI  Resp: Normal effort    No " audible wheezing or stridor  CV: Palpable pulse  No discernable arrhythmia  No LE edema  Lymph:  No palpable cervical, axillary, or inguinal lymphadenopathy  Skin: Warm  No palpable masses  No visible lesions  Neuro: Normal muscle tone  Normal and symmetric DTR's  Left Knee Exam  Alignment:  Normal knee alignment  Inspection:  Mild swelling left knee, incisions well approximated without active drainage at this time  Incisions were cleaned with alcohol and new Steri-Strips were placed at this time  Palpation:  Mild tenderness at Karrie-incisional areas and generally around the knee with deeper palpation  ROM:  Knee Extension 10 degrees limited due to pain  Knee Flexion 90 degrees  Strength:  SLR with 20 degree extension lag  Able to actively extend knee against gravity  Stability:  Not tested  Tests:  No pertinent positive or negative tests  Patella:  Not tested  Neurovascular:  Sensation intact in DP/SP/Ng/Sa/T nerve distributions  Sensation intact in all digital nerve distributions  Toes warm and perfused  Gait:  Steady with crutches minimally weightbearing on the L leg    Studies Reviewed  No studies to review    Procedures  No procedures today  Medical, Surgical, Family, and Social History  The patient's medical history, family history, and social history, were reviewed and updated as appropriate  Past Medical History:   Diagnosis Date   • Torn ACL (anterior cruciate ligament)     surgical repair today 3/30/2023       Past Surgical History:   Procedure Laterality Date   • IL ARTHRS AIDED ANT CRUCIATE LIGM RPR/AGMNTJ/RCNSTJ Left 3/30/2023    Procedure: Knee Arthroscopy, ACL Debridement;  Surgeon: Darcie Boyer MD;  Location: [facility] OR;  Service: Orthopedics   • WISDOM TOOTH EXTRACTION         History reviewed  No pertinent family history      Social History     Occupational History   • Not on file   Tobacco Use   • Smoking status: Never   • Smokeless tobacco: Never   Vaping Use • Vaping Use: Never used   Substance and Sexual Activity   • Alcohol use:  Yes     Alcohol/week: 20 0 standard drinks     Types: 20 Standard drinks or equivalent per week   • Drug use: Never   • Sexual activity: Never       Allergies   Allergen Reactions   • Bee Venom Anaphylaxis, Fever, Hives, Lightheadedness, Swelling and Throat Swelling         Current Outpatient Medications:   •  naproxen (EC NAPROSYN) 500 MG EC tablet, Take 1 tablet (500 mg total) by mouth 2 (two) times a day with meals, Disp: 60 tablet, Rfl: 0  •  ondansetron (ZOFRAN) 4 mg tablet, Take 1 tablet (4 mg total) by mouth every 8 (eight) hours as needed for nausea or vomiting, Disp: 15 tablet, Rfl: 0  •  oxyCODONE (Roxicodone) 5 immediate release tablet, Take 1 tablet (5 mg total) by mouth every 4 (four) hours as needed for severe pain for up to 10 days Max Daily Amount: 30 mg, Disp: 30 tablet, Rfl: 0      Martha Foote PA-C    Scribe Attestation    I,:   am acting as a scribe while in the presence of the attending physician :       I,:   personally performed the services described in this documentation    as scribed in my presence :

## 2023-04-24 ENCOUNTER — ATHLETIC TRAINING (OUTPATIENT)
Dept: SPORTS MEDICINE | Facility: OTHER | Age: 23
End: 2023-04-24

## 2023-04-24 DIAGNOSIS — Z98.890 S/P ARTHROSCOPY OF LEFT KNEE: Primary | ICD-10-CM

## 2023-04-24 NOTE — PROGRESS NOTES
Athletic Training Progress Note    Name: Leslie Gupta  Age: 24 y o  Assessment/Plan:     Visit Diagnosis: S/P arthroscopy of left knee [Z98 890]    Treatment Plan: See physician plan     []  Follow-up PRN  []  Follow-up prior to next practice/game for re-evaluation  [x]  Daily treatment/rehab  Progress note expected weekly  Subjective: Ath reports to the clinic today begin rehab for his L knee  He states that he is behind in his rehab because he is suppose to have full ROM by the end of the second week, but he is still not there  He states that the hardest thing for him to do is going up and down steps  Objective: Incisions still look clean and uncompromising  Swelling noted in the L knee when compared B/L  Slight warmth over the L knee is also noted  Treatment Log:     Date: 4/24 4/18/23   Playing Status: Out Out        Exercise/Treatment     Knee slides  100x w/ 10s hold   Quad sets  100x w/ 5s hold on bolster   Weight shifts  50x w/ 5s hold   ThermEx  completed   Self Patella mobs  5min        SMR Knee flexion 3x10    Standing knee flexion 2x50    Lunge knee flex through 2x7                 Focus on obtaining full and pain free ROM and normal gait pattern without a limp  He will cont to make appointments  4/24  Completed knee flexion to improve descending stairs  LB ATC    4/20  Able to improve stair climbing today  Sit to stand also improved with TMR  LB ATC    4/19  Completed MHP in flexion, biking, MMT extension, knee curls x48, stair climbing   LEFS: 37  LB ATC

## 2023-04-26 ENCOUNTER — ATHLETIC TRAINING (OUTPATIENT)
Dept: SPORTS MEDICINE | Facility: OTHER | Age: 23
End: 2023-04-26

## 2023-04-26 DIAGNOSIS — Z98.890 S/P ARTHROSCOPY OF LEFT KNEE: Primary | ICD-10-CM

## 2023-04-26 NOTE — PROGRESS NOTES
Athletic Training Progress Note    Name: Yohana Plasencia  Age: 24 y o  Assessment/Plan:     Visit Diagnosis: S/P arthroscopy of left knee [Z98 890]    Treatment Plan: See physician plan     []  Follow-up PRN  []  Follow-up prior to next practice/game for re-evaluation  [x]  Daily treatment/rehab  Progress note expected weekly  Subjective: Ath reports to the clinic today begin rehab for his L knee  He states that he is behind in his rehab because he is suppose to have full ROM by the end of the second week, but he is still not there  He states that the hardest thing for him to do is going up and down steps  Objective: Incisions still look clean and uncompromising  Swelling noted in the L knee when compared B/L  Slight warmth over the L knee is also noted  Treatment Log:     Date: 4/24 4/18/23   Playing Status: Out Out        Exercise/Treatment     Knee slides  100x w/ 10s hold   Quad sets  100x w/ 5s hold on bolster   Weight shifts  50x w/ 5s hold   ThermEx  completed   Self Patella mobs  5min        SMR Knee flexion 3x10    Standing knee flexion 2x50    Lunge knee flex through 2x7                 Focus on obtaining full and pain free ROM and normal gait pattern without a limp  He will cont to make appointments  4/26  Stairs improving  Completed sit to stand, heel touches, MWM knee flexion, and lunge steps  LB ATC      4/24  Completed knee flexion to improve descending stairs  LB ATC    4/20  Able to improve stair climbing today  Sit to stand also improved with TMR  LB ATC    4/19  Completed MHP in flexion, biking, MMT extension, knee curls x48, stair climbing   LEFS: 37  LB ATC

## 2023-05-05 ENCOUNTER — ATHLETIC TRAINING (OUTPATIENT)
Dept: SPORTS MEDICINE | Facility: OTHER | Age: 23
End: 2023-05-05

## 2023-05-05 DIAGNOSIS — Z98.890 S/P ARTHROSCOPY OF LEFT KNEE: Primary | ICD-10-CM

## 2023-05-05 NOTE — PROGRESS NOTES
Athletic Training Progress Note    Name: Greg Kim  Age: 24 y o  Assessment/Plan:     Visit Diagnosis: No primary diagnosis found  Treatment Plan: See physician plan     []  Follow-up PRN  []  Follow-up prior to next practice/game for re-evaluation  [x]  Daily treatment/rehab  Progress note expected weekly  Subjective: Ath reports to the clinic today begin rehab for his L knee  He states that he is behind in his rehab because he is suppose to have full ROM by the end of the second week, but he is still not there  He states that the hardest thing for him to do is going up and down steps  Objective: Incisions still look clean and uncompromising  Swelling noted in the L knee when compared B/L  Slight warmth over the L knee is also noted  Treatment Log:     Date: 4/24 4/18/23   Playing Status: Out Out        Exercise/Treatment     Knee slides  100x w/ 10s hold   Quad sets  100x w/ 5s hold on bolster   Weight shifts  50x w/ 5s hold   ThermEx  completed   Self Patella mobs  5min        SMR Knee flexion 3x10    Standing knee flexion 2x50    Lunge knee flex through 2x7                 Focus on obtaining full and pain free ROM and normal gait pattern without a limp  He will cont to make appointments  5/5  Completed sit to stand, quick squats, scorpions, and heel touch  LB ATC      4/26  Stairs improving  Completed sit to stand, heel touches, MWM knee flexion, and lunge steps  LB ATC      4/24  Completed knee flexion to improve descending stairs  LB ATC    4/20  Able to improve stair climbing today  Sit to stand also improved with TMR  LB ATC    4/19  Completed MHP in flexion, biking, MMT extension, knee curls x48, stair climbing   LEFS: 37  LB ATC

## 2023-05-17 VITALS
WEIGHT: 260 LBS | DIASTOLIC BLOOD PRESSURE: 80 MMHG | HEIGHT: 74 IN | SYSTOLIC BLOOD PRESSURE: 131 MMHG | BODY MASS INDEX: 33.37 KG/M2 | HEART RATE: 64 BPM

## 2023-05-17 DIAGNOSIS — Z98.890 S/P LEFT KNEE ARTHROSCOPY: Primary | ICD-10-CM

## 2023-05-17 NOTE — PROGRESS NOTES
"Orthopaedic Surgery - Office Note  Shaggy Moyer (24 y o  male)   : 2000   MRN: [id number]  Encounter Date: 2023    Chief Complaint   Patient presents with   • Left Knee - Follow-up       Assessment / Plan  S/p left knee diagnostic arthroscopy with ACL debridement, with good progression    · His knee continues to feel very stable upon exam   · We discussed gradually progressing into activity, no formal restrictions  · Compression and ice prn swelling  · The click he feels is normal and will likely resolve, if the pain becomes painful he should contact the office  · Work note given stating he can gradually progress back into activity as tolerated  Return in about 3 months (around 2023) for eduarda w/ Dr Rosa Marx  History of Present Illness  Shaggy Moyer is a 24 y o  male who presents for S/p left knee diagnostic arthroscopy with ACL debridement on 2023  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, working on strengthening however he is now done with school and doing some things on his own now  He does have some mild swelling which he has not recently tried to treat  He is still using caution progressing into some activity  He denies any pain or instability  Review of Systems  Pertinent items are noted in HPI  All other systems were reviewed and are negative  Physical Exam  /80   Pulse 64   Ht 6' 2\" (1 88 m)   Wt 118 kg (260 lb)   BMI 33 38 kg/m²   Cons: Appears well  No apparent distress  Psych: Alert  Oriented x3  Mood and affect normal   Eyes: PERRLA, EOMI  Resp: Normal effort  No audible wheezing or stridor  CV: Palpable pulse  No discernable arrhythmia  No LE edema  Lymph:  No palpable cervical, axillary, or inguinal lymphadenopathy  Skin: Warm  No palpable masses  No visible lesions  Neuro: Normal muscle tone  Normal and symmetric DTR's  Left Knee Exam  Alignment:  Normal knee alignment  Inspection:  mild swelling  No ecchymosis  Incision healed   " minimal quad atrophy  Palpation:  No tenderness  No effusion  ROM:  Knee Extension 0  Knee Flexion 135  Strength:  Able to SLR without lag  Stability:  No objective knee instability  Stable Varus / Valgus stress, Lachman, and Posterior drawer  Tests:  (-) Seferino  Patella:  Normal patellar mobility  Neurovascular:  Sensation intact in DP/SP/Ng/Sa/T nerve distributions  2+ DP & PT pulses  Gait:  Normal     Studies Reviewed  No studies to review    Procedures  No procedures today  Medical, Surgical, Family, and Social History  The patient's medical history, family history, and social history, were reviewed and updated as appropriate  Past Medical History:   Diagnosis Date   • Torn ACL (anterior cruciate ligament)     surgical repair today 3/30/2023       Past Surgical History:   Procedure Laterality Date   • GA ARTHRS AIDED ANT CRUCIATE LIGM RPR/AGMNTJ/RCNSTJ Left 3/30/2023    Procedure: Knee Arthroscopy, ACL Debridement;  Surgeon: Sanchez Shepherd MD;  Location: [facility];  Service: Orthopedics   • WISDOM TOOTH EXTRACTION         History reviewed  No pertinent family history  Social History     Occupational History   • Not on file   Tobacco Use   • Smoking status: Never   • Smokeless tobacco: Never   Vaping Use   • Vaping Use: Never used   Substance and Sexual Activity   • Alcohol use:  Yes     Alcohol/week: 20 0 standard drinks     Types: 20 Standard drinks or equivalent per week   • Drug use: Never   • Sexual activity: Never       Allergies   Allergen Reactions   • Bee Venom Anaphylaxis, Fever, Hives, Lightheadedness, Swelling and Throat Swelling         Current Outpatient Medications:   •  naproxen (EC NAPROSYN) 500 MG EC tablet, Take 1 tablet (500 mg total) by mouth 2 (two) times a day with meals, Disp: 60 tablet, Rfl: 0  •  ondansetron (ZOFRAN) 4 mg tablet, Take 1 tablet (4 mg total) by mouth every 8 (eight) hours as needed for nausea or vomiting, Disp: 15 tablet, Rfl: 0      [facility] ANTONIO Romero    I,:   am acting as a scribe while in the presence of the attending physician :       I,:   personally performed the services described in this documentation    as scribed in my presence :

## 2023-05-17 NOTE — LETTER
May 17, 2023     Patient: Nereyda Hahn  YOB: 2000  Date of Visit: 5/17/2023      To Whom it May Concern:    Nereyda Hahn is under my professional care  Yemi Child was seen in my office on 5/17/2023  Yemi Child is cleared to gradually progress back into activity as tolerated  If you have any questions or concerns, please don't hesitate to call           Sincerely,          Elder Sloan MD        CC: No Recipients

## 2023-06-15 ENCOUNTER — ATHLETIC TRAINING (OUTPATIENT)
Dept: SPORTS MEDICINE | Facility: OTHER | Age: 23
End: 2023-06-15

## 2023-06-15 DIAGNOSIS — Z98.890 S/P ARTHROSCOPIC KNEE SURGERY: Primary | ICD-10-CM

## 2023-06-15 NOTE — PROGRESS NOTES
Athletic Training Progress Note    Name: Corbin Howard  Age: 24 y o  Assessment/Plan:     Visit Diagnosis: S/P arthroscopic knee surgery [Z98 [id number]]    Treatment Plan: See physician plan     []  Follow-up PRN  []  Follow-up prior to next practice/game for re-evaluation  [x]  Daily treatment/rehab  Progress note expected weekly  Subjective: Ath reports to the clinic today begin rehab for his L knee  He states that he is behind in his rehab because he is suppose to have full ROM by the end of the second week, but he is still not there  He states that the hardest thing for him to do is going up and down steps  Objective: Incisions still look clean and uncompromising  Swelling noted in the L knee when compared B/L  Slight warmth over the L knee is also noted  Treatment Log:     Date: 4/24 4/18/23   Playing Status: Out Out        Exercise/Treatment     Knee slides  100x w/ 10s hold   Quad sets  100x w/ 5s hold on bolster   Weight shifts  50x w/ 5s hold   ThermEx  completed   Self Patella mobs  5min        SMR Knee flexion 3x10    Standing knee flexion 2x50    Lunge knee flex through 2x7                 Focus on obtaining full and pain free ROM and normal gait pattern without a limp  He will cont to make appointments  6/15  Pt feels that he is comfortable with stairs  Will workout on his own over summer  LB ATC    5/5  Completed sit to stand, quick squats, scorpions, and heel touch  LB ATC      4/26  Stairs improving  Completed sit to stand, heel touches, MWM knee flexion, and lunge steps  LB ATC      4/24  Completed knee flexion to improve descending stairs  LB ATC    4/20  Able to improve stair climbing today  Sit to stand also improved with TMR  LB ATC    4/19  Completed MHP in flexion, biking, MMT extension, knee curls x48, stair climbing   LEFS: 37  LB ATC

## 2023-08-25 ENCOUNTER — TELEPHONE (OUTPATIENT)
Age: 23
End: 2023-08-25

## 2023-08-25 NOTE — TELEPHONE ENCOUNTER
Caller:     Doctor: Bekah Beckford    Reason for call: Was last seen in May and was told if he didn't need to to come back he could cancel his July appt which he did. He is asking if he can get a letter from the doctor stating that he can return to normal activity.      Call back#: [phone]  Fax Number [phone]

## 2023-09-07 NOTE — TELEPHONE ENCOUNTER
Caller: Patient     Doctor: Gaston Cortez     Reason for call: Asked for us to fax letter over, all was faxed       [phone]

## 2023-09-13 ENCOUNTER — TELEPHONE (OUTPATIENT)
Age: 23
End: 2023-09-13

## 2023-09-13 NOTE — TELEPHONE ENCOUNTER
Caller: Patient     Doctor: Elvis Romero     Reason for call: Patient sending paperwork to office that needs to be completed for the  to make sure patient is cleared. There was a note sent but they will not accept that.     There are directions on the paperwork     Being faxed to : [phone]     Call back#: [phone]

## 2023-09-14 NOTE — TELEPHONE ENCOUNTER
Still nothing in the chart, called and LVM stating we have not received it yet, to re fax it if possible to [phone]

## (undated) DEVICE — ASTOUND STANDARD SURGICAL GOWN, XL: Brand: CONVERTORS

## (undated) DEVICE — 4-PORT MANIFOLD: Brand: NEPTUNE 2

## (undated) DEVICE — BLADE SHAVER DISSECTOR 4MM 13CM COOLCUT

## (undated) DEVICE — CYSTO TUBING TUR Y IRRIGATION

## (undated) DEVICE — 3M™ IOBAN™ 2 ANTIMICROBIAL INCISE DRAPE 6650EZ: Brand: IOBAN™ 2

## (undated) DEVICE — CHLORAPREP HI-LITE 26ML ORANGE

## (undated) DEVICE — BETHLEHEM UNIVERSAL  ARTHRO PK: Brand: CARDINAL HEALTH

## (undated) DEVICE — SUT TIGERLOOP #2 20IN AR-7234T

## (undated) DEVICE — BLADE SHAVER DISSECTOR 5MM 13CM COOLCUT

## (undated) DEVICE — TUBING EXTENSION 6 FT CONTIN WAVE

## (undated) DEVICE — PADDING CAST 6IN COTTON STRL

## (undated) DEVICE — SCD SEQUENTIAL COMPRESSION COMFORT SLEEVE MEDIUM KNEE LENGTH: Brand: KENDALL SCD

## (undated) DEVICE — SYRINGE 20ML LL

## (undated) DEVICE — ACE WRAP 6 IN UNSTERILE

## (undated) DEVICE — INTENDED FOR TISSUE SEPARATION, AND OTHER PROCEDURES THAT REQUIRE A SHARP SURGICAL BLADE TO PUNCTURE OR CUT.: Brand: BARD-PARKER ® CARBON RIB-BACK BLADES

## (undated) DEVICE — SPONGE LAP 18 X 18 IN STRL RFD

## (undated) DEVICE — HEAVY DUTY TABLE COVER: Brand: CONVERTORS

## (undated) DEVICE — GLOVE SRG BIOGEL 7.5

## (undated) DEVICE — TIBURON SPLIT SHEET: Brand: CONVERTORS

## (undated) DEVICE — LIGHT GLOVE GREEN

## (undated) DEVICE — IMPERVIOUS STOCKINETTE: Brand: DEROYAL

## (undated) DEVICE — SUT TIGERSTICK TIGERWIRE 50IN WHITE/BLACK

## (undated) DEVICE — ADHESIVE SKIN HIGH VISCOSITY EXOFIN 1ML

## (undated) DEVICE — NEEDLE 18 G X 1 1/2

## (undated) DEVICE — UNDYED BRAIDED (POLYGLACTIN 910), SYNTHETIC ABSORBABLE SUTURE: Brand: COATED VICRYL

## (undated) DEVICE — COBAN 6 IN STERILE

## (undated) DEVICE — PUDDLE VAC

## (undated) DEVICE — GLOVE INDICATOR PI UNDERGLOVE SZ 7 BLUE

## (undated) DEVICE — MEDI-VAC YANKAUER SUCTION HANDLE W/BULBOUS AND CONTROL VENT: Brand: CARDINAL HEALTH

## (undated) DEVICE — SUT STRATAFIX SPIRAL MONOCRYL PLUS 2-0 CT-1 30CM SXMP1B412

## (undated) DEVICE — 3M™ STERI-DRAPE™ U-DRAPE 1015: Brand: STERI-DRAPE™

## (undated) DEVICE — MAYO STAND COVER: Brand: CONVERTORS

## (undated) DEVICE — ABDOMINAL PAD: Brand: DERMACEA

## (undated) DEVICE — GLOVE INDICATOR PI UNDERGLOVE SZ 8.5 BLUE

## (undated) DEVICE — CUTTER FLIPCUTTER III 6-12MM

## (undated) DEVICE — GLOVE SRG BIOGEL 8

## (undated) DEVICE — PROBE ABLATION  APOLLO RF 90 DEG MULTI PORT

## (undated) DEVICE — SUT 2 FIBERLOOP AR-7234

## (undated) DEVICE — DRESSING MEPILEX AG BORDER POST-OP 4 X 6 IN

## (undated) DEVICE — GLOVE SRG BIOGEL 6.5

## (undated) DEVICE — PENCIL ELECTROSURG E-Z CLEAN -0035H

## (undated) DEVICE — DRESSING MEPILEX AG BORDER POST-OP 4 X 8 IN

## (undated) DEVICE — SUT FIBERSTICK #2 50IN BLUE

## (undated) DEVICE — SUT MONOCRYL 2-0 SH 27 IN Y417H